# Patient Record
Sex: FEMALE | HISPANIC OR LATINO | Employment: FULL TIME | URBAN - METROPOLITAN AREA
[De-identification: names, ages, dates, MRNs, and addresses within clinical notes are randomized per-mention and may not be internally consistent; named-entity substitution may affect disease eponyms.]

---

## 2017-03-21 ENCOUNTER — OCCUPATIONAL MEDICINE (OUTPATIENT)
Dept: URGENT CARE | Facility: CLINIC | Age: 62
End: 2017-03-21
Payer: COMMERCIAL

## 2017-03-21 ENCOUNTER — APPOINTMENT (OUTPATIENT)
Dept: RADIOLOGY | Facility: IMAGING CENTER | Age: 62
End: 2017-03-21
Attending: FAMILY MEDICINE
Payer: COMMERCIAL

## 2017-03-21 VITALS
WEIGHT: 134 LBS | HEART RATE: 60 BPM | OXYGEN SATURATION: 98 % | RESPIRATION RATE: 14 BRPM | BODY MASS INDEX: 26.31 KG/M2 | TEMPERATURE: 97.3 F | SYSTOLIC BLOOD PRESSURE: 130 MMHG | DIASTOLIC BLOOD PRESSURE: 74 MMHG | HEIGHT: 60 IN

## 2017-03-21 DIAGNOSIS — S69.91XA HAND INJURY, RIGHT, INITIAL ENCOUNTER: ICD-10-CM

## 2017-03-21 DIAGNOSIS — Z02.1 PRE-EMPLOYMENT DRUG SCREENING: ICD-10-CM

## 2017-03-21 DIAGNOSIS — T14.8XXA HEMATOMA: ICD-10-CM

## 2017-03-21 LAB
AMP AMPHETAMINE: NORMAL
BREATH ALCOHOL COMMENT: NORMAL
COC COCAINE: NORMAL
INT CON NEG: NORMAL
INT CON POS: NORMAL
MET METHAMPHETAMINES: NORMAL
OPI OPIATES: NORMAL
PCP PHENCYCLIDINE: NORMAL
POC BREATHALIZER: 0 PERCENT (ref 0–0.01)
POC DRUG COMMENT 753798-OCCUPATIONAL HEALTH: NEGATIVE
THC: NORMAL

## 2017-03-21 PROCEDURE — 82075 ASSAY OF BREATH ETHANOL: CPT | Performed by: FAMILY MEDICINE

## 2017-03-21 PROCEDURE — 99202 OFFICE O/P NEW SF 15 MIN: CPT | Performed by: FAMILY MEDICINE

## 2017-03-21 PROCEDURE — 80300 POCT 6 PANEL URINE DRUG SCREEN - INSTANT: CPT | Performed by: FAMILY MEDICINE

## 2017-03-21 PROCEDURE — 73130 X-RAY EXAM OF HAND: CPT | Mod: TC,RT | Performed by: FAMILY MEDICINE

## 2017-03-21 NOTE — MR AVS SNAPSHOT
Katelynn Shaffer   3/21/2017 5:15 PM   Occupational Medicine   MRN: [id number]    Department:  [facility]   Dept Phone:  [phone]    Description:  Female : 1955   Provider:  Paul Fairchild M.D.           Reason for Visit     Hand Injury r hand injury today .      Allergies as of 3/21/2017     No Known Allergies      You were diagnosed with     Hematoma   [157978]       Pre-employment drug screening   [088630]         Vital Signs     Blood Pressure Pulse Temperature Respirations Height Weight    130/74 mmHg 60 36.3 °C (97.3 °F) 14 1.524 m (5') 60.782 kg (134 lb)    Body Mass Index Oxygen Saturation Smoking Status             26.17 kg/m2 98% Never Smoker          Basic Information     Date Of Birth Sex Race Ethnicity Preferred Language    1955 Female  or   Origin (British,Thai,Argentine,Raheem, etc) English      Problem List              ICD-10-CM Priority Class Noted - Resolved    Kidney stone N20.0 Low  2013 - Present    SBO (small bowel obstruction) (CMS-HCC) K56.69 Low  2013 - Present    NSTEMI (non-ST elevated myocardial infarction) (CMS-HCC) I21.4 Medium  6/3/2016 - Present    Obstruction of left ureteropelvic junction (UPJ) due to stone N20.1 Low  2016 - Present    Hydronephrosis, left N13.30 Low  2016 - Present    Dizziness R42 Medium  2016 - Present    Stented coronary artery Z95.5 Medium  6/10/2016 - Present    Coronary artery disease involving native coronary artery of native heart with angina pectoris (CMS-HCC) I25.119 Medium  2016 - Present    Pure hypercholesterolemia E78.00 Medium  2016 - Present      Health Maintenance        Date Due Completion Dates    IMM DTaP/Tdap/Td Vaccine (1 - Tdap) 1976 ---    PAP SMEAR 1978 ---    COLONOSCOPY 2007 ---    MAMMOGRAM 3/14/2013 3/14/2012    IMM INFLUENZA (1) 2016 ---            Results     POCT Breath Alcohol Test      Component Value Standard Range &  Units    POC Breathalizer 0.000 0.00 - 0.01 Percent    Breath Alcohol Comment                  POCT 6 Panel Urine Drug Screen      Component    AMPHETAMINE    POC THC    COCAINE    OPIATES    PHENCYCLIDINE    METHAMPHETAMINES    POC Urine Drug Screen Comment    Negative    Internal Control Positive    Valid    Internal Control Negative    Valid                        Current Immunizations     No immunizations on file.      Below and/or attached are the medications your provider expects you to take. Review all of your home medications and newly ordered medications with your provider and/or pharmacist. Follow medication instructions as directed by your provider and/or pharmacist. Please keep your medication list with you and share with your provider. Update the information when medications are discontinued, doses are changed, or new medications (including over-the-counter products) are added; and carry medication information at all times in the event of emergency situations     Allergies:  No Known Allergies          Medications  Valid as of: March 21, 2017 -  6:51 PM    Generic Name Brand Name Tablet Size Instructions for use    Aspirin (Tablet Delayed Response) ECOTRIN 81 MG Take 1 Tab by mouth every day.        Atorvastatin Calcium (Tab) LIPITOR 80 MG Take 1 Tab by mouth every day.        Clopidogrel Bisulfate (Tab) PLAVIX 75 MG Take 1 Tab by mouth every day.        Metoprolol Succinate (TABLET SR 24 HR) TOPROL XL 25 MG Take 1 Tab by mouth every day.        Multiple Vitamin (Tab) THERAGRAN  Take 1 Tab by mouth every day.        Nitroglycerin (SL Tab) NITROSTAT 0.4 MG Place 1 Tab under tongue as needed for Chest Pain.        Omega-3 Fatty Acids   Take 1 Cap by mouth every day.        Vitamin E (Cap) VITAMIN E 400 UNIT Take 400 Units by mouth every day.        .                 Medicines prescribed today were sent to:     [facility] [address] [place] [address], [place] - [id number] [facility]  [address]    Phone: [phone] Fax: [phone]    Open 24 Hours?: No      Medication refill instructions:       If your prescription bottle indicates you have medication refills left, it is not necessary to call your provider’s office. Please contact your pharmacy and they will refill your medication.    If your prescription bottle indicates you do not have any refills left, you may request refills at any time through one of the following ways: The online [employer] system (except Urgent Care), by calling your provider’s office, or by asking your pharmacy to contact your provider’s office with a refill request. Medication refills are processed only during regular business hours and may not be available until the next business day. Your provider may request additional information or to have a follow-up visit with you prior to refilling your medication.   *Please Note: Medication refills are assigned a new Rx number when refilled electronically. Your pharmacy may indicate that no refills were authorized even though a new prescription for the same medication is available at the pharmacy. Please request the medicine by name with the pharmacy before contacting your provider for a refill.        Your To Do List     Future Labs/Procedures Complete By Expires    DX-HAND 3+ RIGHT  As directed 3/21/2018      Instructions    Hematoma  (Hematoma)  Un hematoma es dylan acumulación de edd debajo de la piel, en un órgano, en un sitio del cuerpo, en un espacio articular, o en otro tejido. La edd puede coagularse para formar dylan masa que se puede beto y sentir. El bulto suele ser firme, y a veces doloroso y sensible. La mayoría de los hematomas mejoran en unos pocos días o semanas. Sin embargo, algunos hematomas pueden ser graves y requieren atención médica. Los hematomas pueden variar en tamaño desde muy pequeños hasta muy grandes.  CAUSAS   La causa de un hematoma puede ser un traumatismo cerrado o penetrante. Otra causa puede ser  la pérdida de edd espontánea de un vaso sanguíneo bajo la piel. La pérdida espontánea de edd en un vaso sanguíneo es probable que ocurra en personas de edad avanzada, especialmente en los que ashleigh anticoagulantes. A veces, un hematoma puede aparecer después de ciertos procedimientos médicos.  SIGNOS Y SÍNTOMAS   · Un bulto firme en el cuerpo.  · Dolor y sensibilidad en el área.  · Moretón.  La piel puede aparecer de color jaskaran, cox púrpura o amarilla en el sitio del hematoma, si está cerca de la superficie de la piel.  En los hematomas que se encuentran en tejidos más profundos, los signos y los síntomas pueden ser sutiles. Por ejemplo, un hematoma intraabdominal puede causar dolor, debilidad, desmayos y dificultad para respirar. Un hematoma intracraneal puede causar dolor de chasity o síntomas bradly debilidad, dificultad para hablar o un cambio en el estado de conciencia.  DIAGNÓSTICO   El hematoma generalmente se diagnostica basándose en la historia clínica y con un examen físico. Será necesario realizar un diagnóstico por imágenes si el médico sospecha un hematoma en tejidos profundos o espacios corporales, bradly el abdomen, la chasity o el tórax. Estos estudios pueden incluir dylan ecografía o dylan tomografía computada.   TRATAMIENTO   Los hematomas generalmente desaparecen por sí mismos con el tiempo. Renetta vez la edd debe ser drenada. Los hematomas más grandes o los que puedan afectar órganos vitales requerirán un drenaje quirúrgico o controles.  INSTRUCCIONES PARA EL CUIDADO EN EL HOGAR   · Aplique hielo sobre la fiorella lesionada:   ¨ Ponga el hielo en dylan bolsa plástica.    ¨ Colóquese dylan toalla entre la piel y la bolsa de hielo.    ¨ Deje el hielo wilma 20 minutos 2 a 3 veces por día, wilma los 1 o 2 primeros días.    · Después de los primeros 2 días, aplique compresas calientes sobre el hematoma.    · Eleve el área lesionada para ayudar a reducir el dolor y la hinchazón. Envolver la fiorella con un  vendaje elástico también puede ser útil. La compresión ayuda a reducir la inflamación y promueve la reducción del hematoma. Asegúrese de que el vendaje no se ajusta demasiado.    · Si el hematoma se produjo en dylan extremidad inferior y es dolorosa, puede aliviarlo el uso de muletas wilma algunos días.    · [place] sólo medicamentos de venta den o recetados, según las indicaciones del médico.  SOLICITE ATENCIÓN MÉDICA DE INMEDIATO SI:   · Aumenta el dolor y no puede controlarlo con la medicación.    · Tiene fiebre.    · La hinchazón o la decoloración aumentan.    · La piel sobre el hematoma se rompe o comienza a sangrar.    · El hematoma se encuentra en el tórax o el abdomen y siente debilidad, le falta el aire o cambia moreno estado de conciencia.  · El hematoma se encuentra en el cuero cabelludo (causado por dylan caída o dylan lesión) y tiene un dolor de chasity que empeora o hay un cambio en el estado de alerta o de conciencia.  ASEGÚRESE DE QUE:   · Comprende estas instrucciones.  · Controlará moreno afección.  · Recibirá ayuda de inmediato si no mejora o si empeora.     Esta información no tiene bradly fin reemplazar el consejo del médico. Asegúrese de hacerle al médico cualquier pregunta que tenga.     Document Released: 03/26/2009 Document Revised: 08/20/2014  [employer] Interactive Patient Education ©2016 [employer]            Coub Access Code: Activation code not generated  Current Coub Status: Active

## 2017-03-21 NOTE — Clinical Note
EMPLOYEE’S CLAIM FOR COMPENSATION/ REPORT OF INITIAL TREATMENT  FORM C-4    EMPLOYEE’S CLAIM - PROVIDE ALL INFORMATION REQUESTED   First Name  Katelynn Last Name  Edmund Birthdate                    1955                Sex  female Claim Number   Home Address  [address]  Age  61 y.o. Height  1.524 m (5') Weight  60.782 kg (134 lb) [facility]     [facility] [place]  [address] Telephone  [phone] (home)    Mailing Address  [address]  [facility] [place] [facility] [place]  [address] Primary Language Spoken  English    Insurer  [employer]   Workers Choice   Employee's Occupation (Job Title) When Injury or Occupational Disease Occurred       Employer's Name  [employer]  Telephone  [phone]    Employer Address  [address]  [facility] [place] [facility]  Zip  [address]   Date of Injury  3/20/2017               Hour of Injury  10:40 PM Date Employer Notified  3/20/2017 Last Day of Work after Injury or Occupational Disease  3/20/2017 Supervisor to Whom Injury Reported  KAITLYNN BORGES   Address or Location of Accident (if applicable)  [[address] ]   What were you doing at the time of accident? 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 (Be specific an answer in detail. 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  NA Witnesses to the Accident  NA      Nature of Injury or Occupational Disease  Contusion  Part(s) of Body Injured or  Affected  Hand (R), N/A, N/A    I certify that the above is true and correct to the best of my knowledge and that I have provided this information in order to obtain the benefits of [place]  I hereby authorize any physician, chiropractor, surgeon, practitioner, or other person, any hospital, including [facility] or [facility], any medical service organization, any insurance company, or other institution or organization to release to each other, any medical or other information, including benefits paid or payable, pertinent to this injury or disease, except information relative to diagnosis, treatment and/or counseling for AIDS, psychological conditions, alcohol or controlled substances, for which I must give specific authorization.  A Photostat of this authorization shall be as valid as the original.     Date   Place   Employee’s Signature   THIS REPORT MUST BE COMPLETED AND MAILED WITHIN 3 WORKING DAYS OF TREATMENT   Place  [facility]  Name of [facility]   Date  3/21/2017 Diagnosis  S69.91XA) Hand injury, right, initial encounter   Is there evidence the injured employee was under the influence of alcohol and/or another controlled substance at the time of accident?   Hour  5:34 PM Description of Injury or Disease  Diagnoses of Hand injury, right, initial encounter No   Treatment  Rice therapy. Ibuprofen as needed.  Have you advised the patient to remain off work five days or more? No   X-Ray Findings  Negative   If Yes   From Date  To Date      From information given by the employee, together with medical evidence, can you directly connect this injury or occupational disease as job incurred?  Yes If No Full Duty  Yes Modified Duty      Is additional medical care by a physician indicated?  No If Modified Duty, Specify any Limitations / Restrictions      Do you know of  "any previous injury or disease contributing to this condition or occupational disease?                            No   Date  3/21/2017 Print Doctor’s Name Abby Fairchild M.D. I certify the employer’s copy of  this form was mailed on:   Address  [address]     [facility] Zip  [address]    Provider’s Tax ID Number  [id number]  Telephone  Dept: [phone]        monica-ABBY Barahona M.D.   e-Signature: Dr. Kalin Pennington, Medical Director Degree  MD        ORIGINAL-TREATING PHYSICIAN OR CHIROPRACTOR    PAGE 2-INSURER/TPA    PAGE 3-EMPLOYER    PAGE 4-EMPLOYEE             Form C-4 (rev10/07)              BRIEF DESCRIPTION OF RIGHTS AND BENEFITS  (Pursuant to [place] 616C.050)    Notice of Injury or Occupational Disease (Incident Report Form C-1): If an injury or occupational disease (OD) arises out of and in the  course of employment, you must provide written notice to your employer as soon as practicable, but no later than 7 days after the accident or  OD. Your employer shall maintain a sufficient supply of the required forms.    Claim for Compensation (Form C-4): If medical treatment is sought, the form C-4 is available at the place of initial treatment. A completed  \"Claim for Compensation\" (Form C-4) must be filed within 90 days after an accident or OD. The treating physician or chiropractor must,  within 3 working days after treatment, complete and mail to the employer, the employer's insurer and third-party , the Claim for  Compensation.    Medical Treatment: If you require medical treatment for your on-the-job injury or OD, you may be required to select a physician or  chiropractor from a list provided by your workers’ compensation insurer, if it has contracted with an Organization for Managed Care (MCO) or  Preferred Provider Organization (PPO) or providers of health care. If your employer has not entered into a contract with an MCO or PPO, you  may select a " physician or chiropractor from the Panel of Physicians and Chiropractors. Any medical costs related to your industrial injury or  OD will be paid by your insurer.    Temporary Total Disability (TTD): If your doctor has certified that you are unable to work for a period of at least 5 consecutive days, or 5  cumulative days in a 20-day period, or places restrictions on you that your employer does not accommodate, you may be entitled to TTD  compensation.    Temporary Partial Disability (TPD): If the wage you receive upon reemployment is less than the compensation for TTD to which you are  entitled, the insurer may be required to pay you TPD compensation to make up the difference. TPD can only be paid for a maximum of 24  months.    Permanent Partial Disability (PPD): When your medical condition is stable and there is an indication of a PPD as a result of your injury or  OD, within 30 days, your insurer must arrange for an evaluation by a rating physician or chiropractor to determine the degree of your PPD. The  amount of your PPD award depends on the date of injury, the results of the PPD evaluation and your age and wage.    Permanent Total Disability (PTD): If you are medically certified by a treating physician or chiropractor as permanently and totally disabled  and have been granted a PTD status by your insurer, you are entitled to receive monthly benefits not to exceed 66 2/3% of your average  monthly wage. The amount of your PTD payments is subject to reduction if you previously received a PPD award.    Vocational Rehabilitation Services: You may be eligible for vocational rehabilitation services if you are unable to return to the job due to a  permanent physical impairment or permanent restrictions as a result of your injury or occupational disease.    Transportation and Per Scooby Reimbursement: You may be eligible for travel expenses and per scooby associated with medical treatment.    Reopening: You may be able  to reopen your claim if your condition worsens after claim closure.    Appeal Process: If you disagree with a written determination issued by the insurer or the insurer does not respond to your request, you may  appeal to the Department of Administration, , by following the instructions contained in your determination letter. You must  appeal the determination within 70 days from the date of the determination letter at [address], [address], [place], [place]  [address], or [address], [place], [place] [address]. If you disagree with the  decision, you may appeal to the  Department of Administration, . You must file your appeal within 30 days from the date of the  decision  letter at [address], [address], [place], [place] [address], or [address], [place], [place] [address]. If you  disagree with a decision of an , you may file a petition for judicial review with the District Court. You must do so within 30  days of the Appeal Officer’s decision. You may be represented by an  at your own expense or you may contact the [facility] for possible  representation.    [place]  for Injured Workers (NAIW): If you disagree with a  decision, you may request that NAIW represent you  without charge at an  Hearing. For information regarding denial of benefits, you may contact the [facility] at: [address], [place] [address], [phone], or [address], [place] [address], [phone]    To File a Complaint with the Division: If you wish to file a complaint with the  of the Division of Industrial Relations (DIR),  please contact the Workers’ Compensation Section, [address], [address], [place], [place] [address], telephone [phone]) [phone], or  [address], [address],  [place], [place] [address], telephone [phone].    For assistance with Workers’ Compensation Issues: you may contact the Office of the Governor Consumer Health Assistance, [address] [place] [address], [address], [place], [place] [address], Toll Free 1-447.470.7843, Web site: [email or web address]., E-mail  [email or web address].                                                                                                                                                                                                                                   __________________________________________________________________                                                                   _________________                Employee Name / Signature                                                                                                                                                       Date                                                                                                                                                                                                     D-2 (rev. 10/07)

## 2017-03-21 NOTE — Clinical Note
[address] [place] [address]  Phone: [phone] - Fax: [phone]        [facility] Progress Report and Disability Certification  Date of Service: 3/21/2017   No Show:  No  Date / Time of Next Visit:  MMI   Claim Information   Patient Name: Katelynn Shaffer  Claim Number:     Employer: [employer]  Date of Injury: 3/20/2017     Insurer / TPA: Workers Choice  ID / SSN:     Occupation:    Diagnosis: Diagnoses of Hand injury, right, initial encounter were pertinent to this visit.    Medical Information   Related to Industrial Injury? Yes    Subjective Complaints:  Date of injury 3/20/2017. Crush injury sustained to right hand. Pain with movement. No weakness noted. No sensation loss noted. Swelling and discoloration noted by patient immediately after injury.   Objective Findings: Large hematoma noted to dorsum of right hand with tenderness to palpation palmar and dorsal aspects of right hand. Strength intact. Neurovascular intact right hand.   Pre-Existing Condition(s):     Assessment:   Initial Visit    Status: Discharged /  MMI  Permanent Disability:No    Plan:      Diagnostics: X-ray    Comments:       Disability Information   Status: Released to Full Duty    From:     Through:   Restrictions are:     Physical Restrictions   Sitting:    Standing:    Stooping:    Bending:      Squatting:    Walking:    Climbing:    Pushing:      Pulling:    Other:    Reaching Above Shoulder (L):   Reaching Above Shoulder (R):       Reaching Below Shoulder (L):    Reaching Below Shoulder (R):      Not to exceed Weight Limits   Carrying(hrs):   Weight Limit(lb):   Lifting(hrs):   Weight  Limit(lb):     Comments:      Repetitive Actions   Hands: i.e. Fine Manipulations from Grasping:     Feet: i.e. Operating Foot Controls:     Driving / Operate Machinery:     Physician Name: Paul Fairchild M.D. Physician Signature: PAUL Caceres  M.D. e-Signature: Dr. Kalin Pennington, Medical Director   Clinic Name / Location: [address]  [place] [address] Clinic Phone Number: Dept: [phone]   Appointment Time: 5:15 Pm Visit Start Time: 5:34 PM   Check-In Time:  5:24 Pm Visit Discharge Time:  6:29 PM    Original-Treating Physician or Chiropractor    Page 2-Insurer/TPA    Page 3-Employer    Page 4-Employee

## 2017-03-22 NOTE — PATIENT INSTRUCTIONS
Hematoma  (Hematoma)  Un hematoma es dylan acumulación de ded debajo de la piel, en un órgano, en un sitio del cuerpo, en un espacio articular, o en otro tejido. La edd puede coagularse para formar dylan masa que se puede beto y sentir. El bulto suele ser firme, y a veces doloroso y sensible. La mayoría de los hematomas mejoran en unos pocos días o semanas. Sin embargo, algunos hematomas pueden ser graves y requieren atención médica. Los hematomas pueden variar en tamaño desde muy pequeños hasta muy grandes.  CAUSAS   La causa de un hematoma puede ser un traumatismo cerrado o penetrante. Otra causa puede ser la pérdida de edd espontánea de un vaso sanguíneo bajo la piel. La pérdida espontánea de edd en un vaso sanguíneo es probable que ocurra en personas de edad avanzada, especialmente en los que ashleigh anticoagulantes. A veces, un hematoma puede aparecer después de ciertos procedimientos médicos.  SIGNOS Y SÍNTOMAS   · Un bulto firme en el cuerpo.  · Dolor y sensibilidad en el área.  · Moretón.  La piel puede aparecer de color jaskaran, cox púrpura o amarilla en el sitio del hematoma, si está cerca de la superficie de la piel.  En los hematomas que se encuentran en tejidos más profundos, los signos y los síntomas pueden ser sutiles. Por ejemplo, un hematoma intraabdominal puede causar dolor, debilidad, desmayos y dificultad para respirar. Un hematoma intracraneal puede causar dolor de chasity o síntomas bradly debilidad, dificultad para hablar o un cambio en el estado de conciencia.  DIAGNÓSTICO   El hematoma generalmente se diagnostica basándose en la historia clínica y con un examen físico. Será necesario realizar un diagnóstico por imágenes si el médico sospecha un hematoma en tejidos profundos o espacios corporales, bradly el abdomen, la chasity o el tórax. Estos estudios pueden incluir dylan ecografía o dylan tomografía computada.   TRATAMIENTO   Los hematomas generalmente desaparecen por sí mismos con el tiempo.  Renetta vez la edd debe ser drenada. Los hematomas más grandes o los que puedan afectar órganos vitales requerirán un drenaje quirúrgico o controles.  INSTRUCCIONES PARA EL CUIDADO EN EL HOGAR   · Aplique hielo sobre la fiorella lesionada:   ¨ Ponga el hielo en dylan bolsa plástica.    ¨ Colóquese dylan toalla entre la piel y la bolsa de hielo.    ¨ Deje el hielo wilma 20 minutos 2 a 3 veces por día, wilma los 1 o 2 primeros días.    · Después de los primeros 2 días, aplique compresas calientes sobre el hematoma.    · Eleve el área lesionada para ayudar a reducir el dolor y la hinchazón. Envolver la fiorella con un vendaje elástico también puede ser útil. La compresión ayuda a reducir la inflamación y promueve la reducción del hematoma. Asegúrese de que el vendaje no se ajusta demasiado.    · Si el hematoma se produjo en dylan extremidad inferior y es dolorosa, puede aliviarlo el uso de muletas wilma algunos días.    · [place] sólo medicamentos de venta den o recetados, según las indicaciones del médico.  SOLICITE ATENCIÓN MÉDICA DE INMEDIATO SI:   · Aumenta el dolor y no puede controlarlo con la medicación.    · Tiene fiebre.    · La hinchazón o la decoloración aumentan.    · La piel sobre el hematoma se rompe o comienza a sangrar.    · El hematoma se encuentra en el tórax o el abdomen y siente debilidad, le falta el aire o cambia moreno estado de conciencia.  · El hematoma se encuentra en el cuero cabelludo (causado por dylan caída o dylan lesión) y tiene un dolor de chasity que empeora o hay un cambio en el estado de alerta o de conciencia.  ASEGÚRESE DE QUE:   · Comprende estas instrucciones.  · Controlará moreno afección.  · Recibirá ayuda de inmediato si no mejora o si empeora.     Esta información no tiene bradly fin reemplazar el consejo del médico. Asegúrese de hacerle al médico cualquier pregunta que tenga.     Document Released: 03/26/2009 Document Revised: 08/20/2014  Elsevier Interactive Patient Education ©2016 Elsevier  Inc.

## 2017-04-03 NOTE — PROGRESS NOTES
Subjective:      Katelynn Shaffer is a 61 y.o. female who presents with Hand Injury      Date of injury 3/21/2017. Crush injury sustained to right hand. Pain with movement. No weakness noted. No sensation loss noted. Swelling and discoloration noted by patient immediately after injury.     Hand Injury  This is a new problem. The current episode started today. The problem occurs constantly.       ROS       Objective:     /74 mmHg  Pulse 60  Temp(Src) 36.3 °C (97.3 °F)  Resp 14  Ht 1.524 m (5')  Wt 60.782 kg (134 lb)  BMI 26.17 kg/m2  SpO2 98%     Physical Exam   Constitutional: She is oriented to person, place, and time. She appears well-developed and well-nourished. No distress.   Eyes: EOM are normal. Pupils are equal, round, and reactive to light.   Cardiovascular: Normal rate, regular rhythm, normal heart sounds and intact distal pulses.    Pulmonary/Chest: Effort normal and breath sounds normal. No respiratory distress.   Abdominal: Soft. Bowel sounds are normal. She exhibits no distension.   Musculoskeletal: Normal range of motion.   Neurological: She is alert and oriented to person, place, and time. She has normal reflexes.   Skin: Skin is warm and dry.   Psychiatric: She has a normal mood and affect. Her behavior is normal.       Large hematoma noted to dorsum of right hand with tenderness to palpation palmar and dorsal aspects of right hand. Strength intact. Neurovascular intact right hand.       Assessment/Plan:     1. Hematoma  Differential diagnosis, natural history, supportive care, and indications for immediate follow-up discussed. Use over-the-counter pain reliever, such as acetaminophen (Tylenol), ibuprofen (Advil, Motrin) or naproxen (Aleve) as needed; follow package directions for dosing.   - DX-HAND 3+ RIGHT; Future  - POCT Breath Alcohol Test  - POCT 6 Panel Urine Drug Screen    2. Pre-employment drug screening  Differential diagnosis, natural history, supportive care, and  indications for immediate follow-up discussed.   - POCT Breath Alcohol Test  - POCT 6 Panel Urine Drug Screen

## 2017-05-10 ENCOUNTER — OFFICE VISIT (OUTPATIENT)
Dept: CARDIOLOGY | Facility: MEDICAL CENTER | Age: 62
End: 2017-05-10
Payer: COMMERCIAL

## 2017-05-10 VITALS
BODY MASS INDEX: 24.94 KG/M2 | HEART RATE: 60 BPM | OXYGEN SATURATION: 97 % | DIASTOLIC BLOOD PRESSURE: 70 MMHG | SYSTOLIC BLOOD PRESSURE: 122 MMHG | WEIGHT: 127 LBS | HEIGHT: 60 IN

## 2017-05-10 DIAGNOSIS — R20.0 LEFT ARM NUMBNESS: ICD-10-CM

## 2017-05-10 DIAGNOSIS — E78.5 DYSLIPIDEMIA: ICD-10-CM

## 2017-05-10 DIAGNOSIS — I25.119 CORONARY ARTERY DISEASE INVOLVING NATIVE CORONARY ARTERY OF NATIVE HEART WITH ANGINA PECTORIS (HCC): ICD-10-CM

## 2017-05-10 PROCEDURE — 99214 OFFICE O/P EST MOD 30 MIN: CPT | Performed by: INTERNAL MEDICINE

## 2017-05-10 NOTE — PROGRESS NOTES
Subjective:   Katelynn Shaffer is a 61 y.o. female who presents today for follow-up because of a history of coronary artery disease and dyslipidemia. She's been having difficulty with some left arm numbness continuously over the last couple of weeks.    Over the last couple of days she's noted some continuous left arm numbness which is associated with some pain if she squeezes her hand. She has not really noted any weakness. This tingling sensation and discomfort started in her hand. When the hand is painful than the numbness seems to radiate up her arm.    She's been having some left parasternal discomfort over the left breast. This is in a 2-4 cm diameter area. She describes this as a pressure type sensation which she rates as about 8/10. It lasts about 20 minutes to all day. This pain is associated with her hand discomfort. She notes no aggravating or alleviating factors. It does seem to be worse when she is lying down.    This discomfort is completely different from the discomfort she had with her myocardial infarction prior to her stent placement.    She does have some dyspnea on exertion especially when she is climbing stairs. She's noted no edema, PND or orthopnea.    Past Medical History   Diagnosis Date   • Kidney stones    • Hyperlipidemia    • CAD (coronary artery disease) 06/2016     MIKI X 2 to mid and distal LAD and balloon to diagonal in 12/16     Past Surgical History   Procedure Laterality Date   • Lithotripsy     • Cardiac cath     • Angioplasty       History reviewed. No pertinent family history.  History   Smoking status   • Never Smoker    Smokeless tobacco   • Not on file     No Known Allergies  Outpatient Encounter Prescriptions as of 5/10/2017   Medication Sig Dispense Refill   • nitroglycerin (NITROSTAT) 0.4 MG SL Tab Place 1 Tab under tongue as needed for Chest Pain. 25 Tab 11   • clopidogrel (PLAVIX) 75 MG Tab Take 1 Tab by mouth every day. 90 Tab 3   • atorvastatin (LIPITOR) 80 MG  tablet Take 1 Tab by mouth every day. 90 Tab 3   • aspirin EC (ECOTRIN) 81 MG Tablet Delayed Response Take 1 Tab by mouth every day. 90 Tab 3   • metoprolol SR (TOPROL XL) 25 MG TABLET SR 24 HR Take 1 Tab by mouth every day. 30 Tab 6   • Omega-3 Fatty Acids (OMEGA 3 PO) Take 1 Cap by mouth every day.     • vitamin e (VITAMIN E) 400 UNIT Cap Take 400 Units by mouth every day.     • multivitamin (THERAGRAN) Tab Take 1 Tab by mouth every day.       No facility-administered encounter medications on file as of 5/10/2017.     ROS     Objective:   /70 mmHg  Pulse 60  Ht 1.524 m (5')  Wt 57.607 kg (127 lb)  BMI 24.80 kg/m2  SpO2 97%    Physical Exam   Neck: No JVD present.   Cardiovascular: Normal rate and regular rhythm.  Exam reveals no gallop.    No murmur heard.  Pulmonary/Chest: Effort normal. She has no rales.   Abdominal: Soft. There is no tenderness.   Musculoskeletal: She exhibits no edema.     Lab Results   Component Value Date/Time    CHOLESTEROL, 11/15/2016 10:46 AM    LDL 73 11/15/2016 10:46 AM    HDL 47 11/15/2016 10:46 AM    TRIGLYCERIDES 159* 11/15/2016 10:46 AM       Lab Results   Component Value Date/Time    SODIUM 138 12/12/2016 09:10 AM    POTASSIUM 4.0 12/12/2016 09:10 AM    CHLORIDE 107 12/12/2016 09:10 AM    CO2 26 12/12/2016 09:10 AM    GLUCOSE 109* 12/12/2016 09:10 AM    BUN 12 12/12/2016 09:10 AM    CREATININE 0.58 12/12/2016 09:10 AM     Lab Results   Component Value Date/Time    ALKALINE PHOSPHATASE 133* 11/15/2016 10:46 AM    AST(SGOT) 17 11/15/2016 10:46 AM    ALT(SGPT) 15 11/15/2016 10:46 AM    TOTAL BILIRUBIN 0.8 11/15/2016 10:46 AM      Lab Results   Component Value Date/Time    B NATRIURETIC PEPTIDE 35 06/03/2016 03:23 PM          Assessment:     1. Coronary artery disease: MIKI to LAD ×2 and 6/2016     2. Dyslipidemia     3. Left arm numbness         Medical Decision Making:  Today's Assessment / Status / Plan:     Ms. Shaffer is having some difficulty with atypical chest  discomfort. This is not similar to the pain with her myocardial infarction but she is certainly at risk. She'll be reevaluated with a myocardial perfusion scan. I suspect her hand pain may be secondary to carpal tunnel syndrome. She'll be asked to obtain a hand brace and take over-the-counter naproxen 225 mg 2 Twice a day for a couple weeks. She will follow-up in a couple of weeks. She will have a lipid panel and CMP prior to her follow-up appointment.

## 2017-05-10 NOTE — MR AVS SNAPSHOT
Katelynn Shaffer   5/10/2017 8:30 AM   Office Visit   MRN: [id number]    Department:  [facility]   Dept Phone:  [phone]    Description:  Female : 1955   Provider:  Fransico Fatima M.D.           Reason for Visit     Follow-Up           Allergies as of 5/10/2017     No Known Allergies      You were diagnosed with     Coronary artery disease involving native coronary artery of native heart with angina pectoris (CMS-HCC)   [2715063]       Dyslipidemia   [980891]       Left arm numbness   [945035]         Vital Signs     Blood Pressure Pulse Height Weight Body Mass Index Oxygen Saturation    122/70 mmHg 60 1.524 m (5') 57.607 kg (127 lb) 24.80 kg/m2 97%    Smoking Status                   Never Smoker            Basic Information     Date Of Birth Sex Race Ethnicity Preferred Language    1955 Female  or   Origin (Tongan,Botswanan,[place] Korean,St Helenian, etc) English      Your appointments     May 10, 2017  8:30 AM   PREVIOUS PATIENT with Fransico Fatima M.D.   [facility] (--)    [address], [address]  [place] [address]   [phone]              Problem List              ICD-10-CM Priority Class Noted - Resolved    Kidney stone N20.0 Low  2013 - Present    SBO (small bowel obstruction) (CMS-HCC) K56.69 Low  2013 - Present    NSTEMI (non-ST elevated myocardial infarction) (CMS-HCC) I21.4 Medium  6/3/2016 - Present    Obstruction of left ureteropelvic junction (UPJ) due to stone N20.1 Low  2016 - Present    Hydronephrosis, left N13.30 Low  2016 - Present    Dizziness R42 Medium  2016 - Present    Stented coronary artery Z95.5 Medium  6/10/2016 - Present    Coronary artery disease: MIKI to LAD ×2 and 2016 I25.119 Medium  2016 - Present    Pure hypercholesterolemia E78.00 Medium  2016 - Present    Dyslipidemia E78.5   5/10/2017 - Present    Left arm numbness R20.0   5/10/2017 - Present         Health Maintenance        Date Due Completion Dates    IMM DTaP/Tdap/Td Vaccine (1 - Tdap) 8/11/1976 ---    PAP SMEAR 8/11/1978 ---    COLONOSCOPY 8/11/2007 ---    MAMMOGRAM 3/14/2013 3/14/2012            Current Immunizations     No immunizations on file.      Below and/or attached are the medications your provider expects you to take. Review all of your home medications and newly ordered medications with your provider and/or pharmacist. Follow medication instructions as directed by your provider and/or pharmacist. Please keep your medication list with you and share with your provider. Update the information when medications are discontinued, doses are changed, or new medications (including over-the-counter products) are added; and carry medication information at all times in the event of emergency situations     Allergies:  No Known Allergies          Medications  Valid as of: May 10, 2017 -  8:09 AM    Generic Name Brand Name Tablet Size Instructions for use    Aspirin (Tablet Delayed Response) ECOTRIN 81 MG Take 1 Tab by mouth every day.        Atorvastatin Calcium (Tab) LIPITOR 80 MG Take 1 Tab by mouth every day.        Clopidogrel Bisulfate (Tab) PLAVIX 75 MG Take 1 Tab by mouth every day.        Metoprolol Succinate (TABLET SR 24 HR) TOPROL XL 25 MG Take 1 Tab by mouth every day.        Multiple Vitamin (Tab) THERAGRAN  Take 1 Tab by mouth every day.        Nitroglycerin (SL Tab) NITROSTAT 0.4 MG Place 1 Tab under tongue as needed for Chest Pain.        Omega-3 Fatty Acids   Take 1 Cap by mouth every day.        Vitamin E (Cap) VITAMIN E 400 UNIT Take 400 Units by mouth every day.        .                 Medicines prescribed today were sent to:     [facility] [address] [place], [place] [address] [place] [address] [facility] [address]    Phone: [phone] Fax: [phone]    Open 24 Hours?: No      Medication refill instructions:       If your prescription bottle indicates you have  medication refills left, it is not necessary to call your provider’s office. Please contact your pharmacy and they will refill your medication.    If your prescription bottle indicates you do not have any refills left, you may request refills at any time through one of the following ways: The online [employer] system (except Urgent Care), by calling your provider’s office, or by asking your pharmacy to contact your provider’s office with a refill request. Medication refills are processed only during regular business hours and may not be available until the next business day. Your provider may request additional information or to have a follow-up visit with you prior to refilling your medication.   *Please Note: Medication refills are assigned a new Rx number when refilled electronically. Your pharmacy may indicate that no refills were authorized even though a new prescription for the same medication is available at the pharmacy. Please request the medicine by name with the pharmacy before contacting your provider for a refill.        Your To Do List     Future Labs/Procedures Complete By Expires    COMP METABOLIC PANEL  As directed 5/10/2018    LIPID PROFILE  As directed 5/10/2018    NM-CARDIAC STRESS TEST  As directed 5/10/2018      Instructions    Naproxen 220 mg 2 tabs twice daily for 2 weeks          [employer] Access Code: Activation code not generated  Current [employer] Status: Active

## 2017-05-10 NOTE — Clinical Note
[facility]   [address] [place] [address] [address]  [place] [address]  Phone: [phone]  Fax: [phone]              Katelynn Shaffer  1955    Encounter Date: 5/10/2017    Fransico Fatima M.D.          PROGRESS NOTE:  Subjective:   Katelynn Shafefr is a 61 y.o. female who presents today for follow-up because of a history of coronary artery disease and dyslipidemia. She's been having difficulty with some left arm numbness continuously over the last couple of weeks.    Over the last couple of days she's noted some continuous left arm numbness which is associated with some pain if she squeezes her hand. She has not really noted any weakness. This tingling sensation and discomfort started in her hand. When the hand is painful than the numbness seems to radiate up her arm.    She's been having some left parasternal discomfort over the left breast. This is in a 2-4 cm diameter area. She describes this as a pressure type sensation which she rates as about 8/10. It lasts about 20 minutes to all day. This pain is associated with her hand discomfort. She notes no aggravating or alleviating factors. It does seem to be worse when she is lying down.    This discomfort is completely different from the discomfort she had with her myocardial infarction prior to her stent placement.    She does have some dyspnea on exertion especially when she is climbing stairs. She's noted no edema, PND or orthopnea.    Past Medical History   Diagnosis Date   • Kidney stones    • Hyperlipidemia    • CAD (coronary artery disease) 06/2016     MIKI X 2 to mid and distal LAD and balloon to diagonal in 12/16     Past Surgical History   Procedure Laterality Date   • Lithotripsy     • Cardiac cath     • Angioplasty       History reviewed. No pertinent family history.  History   Smoking status   • Never Smoker    Smokeless tobacco   • Not on file     No Known Allergies  Outpatient Encounter  Prescriptions as of 5/10/2017   Medication Sig Dispense Refill   • nitroglycerin (NITROSTAT) 0.4 MG SL Tab Place 1 Tab under tongue as needed for Chest Pain. 25 Tab 11   • clopidogrel (PLAVIX) 75 MG Tab Take 1 Tab by mouth every day. 90 Tab 3   • atorvastatin (LIPITOR) 80 MG tablet Take 1 Tab by mouth every day. 90 Tab 3   • aspirin EC (ECOTRIN) 81 MG Tablet Delayed Response Take 1 Tab by mouth every day. 90 Tab 3   • metoprolol SR (TOPROL XL) 25 MG TABLET SR 24 HR Take 1 Tab by mouth every day. 30 Tab 6   • Omega-3 Fatty Acids (OMEGA 3 PO) Take 1 Cap by mouth every day.     • vitamin e (VITAMIN E) 400 UNIT Cap Take 400 Units by mouth every day.     • multivitamin (THERAGRAN) Tab Take 1 Tab by mouth every day.       No facility-administered encounter medications on file as of 5/10/2017.     ROS     Objective:   /70 mmHg  Pulse 60  Ht 1.524 m (5')  Wt 57.607 kg (127 lb)  BMI 24.80 kg/m2  SpO2 97%    Physical Exam   Neck: No JVD present.   Cardiovascular: Normal rate and regular rhythm.  Exam reveals no gallop.    No murmur heard.  Pulmonary/Chest: Effort normal. She has no rales.   Abdominal: Soft. There is no tenderness.   Musculoskeletal: She exhibits no edema.     Lab Results   Component Value Date/Time    CHOLESTEROL, 11/15/2016 10:46 AM    LDL 73 11/15/2016 10:46 AM    HDL 47 11/15/2016 10:46 AM    TRIGLYCERIDES 159* 11/15/2016 10:46 AM       Lab Results   Component Value Date/Time    SODIUM 138 12/12/2016 09:10 AM    POTASSIUM 4.0 12/12/2016 09:10 AM    CHLORIDE 107 12/12/2016 09:10 AM    CO2 26 12/12/2016 09:10 AM    GLUCOSE 109* 12/12/2016 09:10 AM    BUN 12 12/12/2016 09:10 AM    CREATININE 0.58 12/12/2016 09:10 AM     Lab Results   Component Value Date/Time    ALKALINE PHOSPHATASE 133* 11/15/2016 10:46 AM    AST(SGOT) 17 11/15/2016 10:46 AM    ALT(SGPT) 15 11/15/2016 10:46 AM    TOTAL BILIRUBIN 0.8 11/15/2016 10:46 AM      Lab Results   Component Value Date/Time    B NATRIURETIC PEPTIDE 35  06/03/2016 03:23 PM          Assessment:     1. Coronary artery disease: MIKI to LAD ×2 and 6/2016     2. Dyslipidemia     3. Left arm numbness         Medical Decision Making:  Today's Assessment / Status / Plan:     Ms. Shaffer is having some difficulty with atypical chest discomfort. This is not similar to the pain with her myocardial infarction but she is certainly at risk. She'll be reevaluated with a myocardial perfusion scan. I suspect her hand pain may be secondary to carpal tunnel syndrome. She'll be asked to obtain a hand brace and take over-the-counter naproxen 225 mg 2 Twice a day for a couple weeks. She will follow-up in a couple of weeks. She will have a lipid panel and CMP prior to her follow-up appointment.      JAGDISH Frey.  [address] [facility] [address]  VIA Facsimile: [phone]

## 2017-05-16 ENCOUNTER — HOSPITAL ENCOUNTER (OUTPATIENT)
Dept: RADIOLOGY | Facility: MEDICAL CENTER | Age: 62
End: 2017-05-16
Attending: INTERNAL MEDICINE
Payer: COMMERCIAL

## 2017-05-16 DIAGNOSIS — E78.5 DYSLIPIDEMIA: ICD-10-CM

## 2017-05-16 DIAGNOSIS — I25.119 CORONARY ARTERY DISEASE INVOLVING NATIVE CORONARY ARTERY OF NATIVE HEART WITH ANGINA PECTORIS (HCC): ICD-10-CM

## 2017-05-16 PROCEDURE — 700111 HCHG RX REV CODE 636 W/ 250 OVERRIDE (IP)

## 2017-05-16 PROCEDURE — A9502 TC99M TETROFOSMIN: HCPCS

## 2017-05-16 RX ORDER — REGADENOSON 0.08 MG/ML
INJECTION, SOLUTION INTRAVENOUS
Status: COMPLETED
Start: 2017-05-16 | End: 2017-05-16

## 2017-05-16 RX ADMIN — REGADENOSON 0.4 MG: 0.08 INJECTION, SOLUTION INTRAVENOUS at 10:31

## 2017-05-26 ENCOUNTER — HOSPITAL ENCOUNTER (OUTPATIENT)
Dept: LAB | Facility: MEDICAL CENTER | Age: 62
End: 2017-05-26
Attending: INTERNAL MEDICINE
Payer: COMMERCIAL

## 2017-05-26 DIAGNOSIS — E78.5 DYSLIPIDEMIA: ICD-10-CM

## 2017-05-26 DIAGNOSIS — I25.119 CORONARY ARTERY DISEASE INVOLVING NATIVE CORONARY ARTERY OF NATIVE HEART WITH ANGINA PECTORIS (HCC): ICD-10-CM

## 2017-05-26 LAB
ALBUMIN SERPL BCP-MCNC: 3.9 G/DL (ref 3.2–4.9)
ALBUMIN/GLOB SERPL: 1.3 G/DL
ALP SERPL-CCNC: 124 U/L (ref 30–99)
ALT SERPL-CCNC: 11 U/L (ref 2–50)
ANION GAP SERPL CALC-SCNC: 6 MMOL/L (ref 0–11.9)
AST SERPL-CCNC: 15 U/L (ref 12–45)
BILIRUB SERPL-MCNC: 0.5 MG/DL (ref 0.1–1.5)
BUN SERPL-MCNC: 15 MG/DL (ref 8–22)
CALCIUM SERPL-MCNC: 9.3 MG/DL (ref 8.5–10.5)
CHLORIDE SERPL-SCNC: 106 MMOL/L (ref 96–112)
CHOLEST SERPL-MCNC: 162 MG/DL (ref 100–199)
CO2 SERPL-SCNC: 27 MMOL/L (ref 20–33)
CREAT SERPL-MCNC: 0.54 MG/DL (ref 0.5–1.4)
GFR SERPL CREATININE-BSD FRML MDRD: >60 ML/MIN/1.73 M 2
GLOBULIN SER CALC-MCNC: 3.1 G/DL (ref 1.9–3.5)
GLUCOSE SERPL-MCNC: 91 MG/DL (ref 65–99)
HDLC SERPL-MCNC: 43 MG/DL
LDLC SERPL CALC-MCNC: 76 MG/DL
POTASSIUM SERPL-SCNC: 4.1 MMOL/L (ref 3.6–5.5)
PROT SERPL-MCNC: 7 G/DL (ref 6–8.2)
SODIUM SERPL-SCNC: 139 MMOL/L (ref 135–145)
TRIGL SERPL-MCNC: 215 MG/DL (ref 0–149)

## 2017-05-26 PROCEDURE — 80053 COMPREHEN METABOLIC PANEL: CPT

## 2017-05-26 PROCEDURE — 80061 LIPID PANEL: CPT

## 2017-05-26 PROCEDURE — 36415 COLL VENOUS BLD VENIPUNCTURE: CPT

## 2017-05-31 ENCOUNTER — OFFICE VISIT (OUTPATIENT)
Dept: CARDIOLOGY | Facility: MEDICAL CENTER | Age: 62
End: 2017-05-31
Payer: COMMERCIAL

## 2017-05-31 VITALS
HEIGHT: 60 IN | SYSTOLIC BLOOD PRESSURE: 130 MMHG | WEIGHT: 129 LBS | DIASTOLIC BLOOD PRESSURE: 78 MMHG | HEART RATE: 58 BPM | BODY MASS INDEX: 25.32 KG/M2 | OXYGEN SATURATION: 95 %

## 2017-05-31 DIAGNOSIS — I25.119 CORONARY ARTERY DISEASE INVOLVING NATIVE CORONARY ARTERY OF NATIVE HEART WITH ANGINA PECTORIS (HCC): ICD-10-CM

## 2017-05-31 DIAGNOSIS — E78.00 PURE HYPERCHOLESTEROLEMIA: ICD-10-CM

## 2017-05-31 DIAGNOSIS — R07.89 OTHER CHEST PAIN: ICD-10-CM

## 2017-05-31 DIAGNOSIS — R20.0 NUMBNESS OF LEFT HAND: ICD-10-CM

## 2017-05-31 PROCEDURE — 99214 OFFICE O/P EST MOD 30 MIN: CPT | Performed by: NURSE PRACTITIONER

## 2017-05-31 RX ORDER — METOPROLOL SUCCINATE 25 MG/1
25 TABLET, EXTENDED RELEASE ORAL DAILY
Qty: 90 TAB | Refills: 3 | Status: SHIPPED | OUTPATIENT
Start: 2017-05-31 | End: 2017-09-11 | Stop reason: SDUPTHER

## 2017-05-31 ASSESSMENT — ENCOUNTER SYMPTOMS
WEAKNESS: 0
PALPITATIONS: 0
DIZZINESS: 0
CLAUDICATION: 0
COUGH: 0
PND: 0
MYALGIAS: 0
ORTHOPNEA: 0
SENSORY CHANGE: 1
ABDOMINAL PAIN: 0
SHORTNESS OF BREATH: 0

## 2017-05-31 NOTE — MR AVS SNAPSHOT
Katelynn Hart   2017 7:40 AM   Office Visit   MRN: [id number]    Department:  [facility]   Dept Phone:  [phone]    Description:  Female : 1955   Provider:  SALUD Bobby           Reason for Visit     Follow-Up           Allergies as of 2017     No Known Allergies      You were diagnosed with     Other chest pain   [786.59.ICD-9-CM]       Numbness of left hand   [5231385]       Coronary artery disease involving native coronary artery of native heart with angina pectoris (CMS-HCC)   [6453444]       Pure hypercholesterolemia   [272.0.ICD-9-CM]         Vital Signs     Blood Pressure Pulse Height Weight Body Mass Index Oxygen Saturation    130/78 mmHg 58 1.524 m (5') 58.514 kg (129 lb) 25.19 kg/m2 95%    Smoking Status                   Never Smoker            Basic Information     Date Of Birth Sex Race Ethnicity Preferred Language    1955 Female  or   Origin (Pashto,Solomon Islander,Eritrean,Samoan, etc) English      Your appointments     2017  2:00 PM   New Patient with Taylor Ashraf M.D.   [facility])    [address]  [place] [address] [phone]            2017 10:20 AM   FOLLOW UP with SALUD Bobby   [facility] (--)    [address], [address]  [place] [address] [phone]              Problem List              ICD-10-CM Priority Class Noted - Resolved    Kidney stone N20.0 Low  2013 - Present    SBO (small bowel obstruction) (CMS-HCC) K56.69 Low  2013 - Present    NSTEMI (non-ST elevated myocardial infarction) (CMS-HCC) I21.4 Medium  6/3/2016 - Present    Obstruction of left ureteropelvic junction (UPJ) due to stone N20.1 Low  2016 - Present    Hydronephrosis, left N13.30 Low  2016 - Present    Dizziness R42 Medium  2016 - Present    Stented coronary artery Z95.5  Medium  6/10/2016 - Present    Coronary artery disease: MIKI to LAD ×2 and 6/2016 I25.119 Medium  8/2/2016 - Present    Dyslipidemia E78.5   5/10/2017 - Present    Numbness of left hand R20.8   5/31/2017 - Present      Health Maintenance        Date Due Completion Dates    IMM DTaP/Tdap/Td Vaccine (1 - Tdap) 8/11/1976 ---    PAP SMEAR 8/11/1978 ---    COLONOSCOPY 8/11/2007 ---    MAMMOGRAM 3/14/2013 3/14/2012            Current Immunizations     No immunizations on file.      Below and/or attached are the medications your provider expects you to take. Review all of your home medications and newly ordered medications with your provider and/or pharmacist. Follow medication instructions as directed by your provider and/or pharmacist. Please keep your medication list with you and share with your provider. Update the information when medications are discontinued, doses are changed, or new medications (including over-the-counter products) are added; and carry medication information at all times in the event of emergency situations     Allergies:  No Known Allergies          Medications  Valid as of: May 31, 2017 -  8:42 AM    Generic Name Brand Name Tablet Size Instructions for use    Aspirin (Tablet Delayed Response) ECOTRIN 81 MG Take 1 Tab by mouth every day.        Atorvastatin Calcium (Tab) LIPITOR 80 MG Take 1 Tab by mouth every day.        Metoprolol Succinate (TABLET SR 24 HR) TOPROL XL 25 MG Take 1 Tab by mouth every day.        Multiple Vitamin (Tab) THERAGRAN  Take 1 Tab by mouth every day.        Nitroglycerin (SL Tab) NITROSTAT 0.4 MG Place 1 Tab under tongue as needed for Chest Pain.        Omega-3 Fatty Acids   Take 1 Cap by mouth every day.        .                 Medicines prescribed today were sent to:     [facility] [address] [place] [address] [place] - [id number] [facility] [address]    Phone: [phone] Fax: [phone]    Open 24 Hours?: No      Medication refill  instructions:       If your prescription bottle indicates you have medication refills left, it is not necessary to call your provider’s office. Please contact your pharmacy and they will refill your medication.    If your prescription bottle indicates you do not have any refills left, you may request refills at any time through one of the following ways: The online Classana system (except Urgent Care), by calling your provider’s office, or by asking your pharmacy to contact your provider’s office with a refill request. Medication refills are processed only during regular business hours and may not be available until the next business day. Your provider may request additional information or to have a follow-up visit with you prior to refilling your medication.   *Please Note: Medication refills are assigned a new Rx number when refilled electronically. Your pharmacy may indicate that no refills were authorized even though a new prescription for the same medication is available at the pharmacy. Please request the medicine by name with the pharmacy before contacting your provider for a refill.           Classana Access Code: Activation code not generated  Current Classana Status: Active

## 2017-05-31 NOTE — PROGRESS NOTES
Subjective:   Katelynn Hart is a 61 y.o. Bengali-speaking female who presents today to follow-up on chest pain and left hand numbness. Translation was used Faisal [id number].     She was seen by Dr. Fatima on May 10, 2017 at which time she was complaining of some pain in her anterior chest and numbness in her left hand. He did not feel that this is cardiac but since she has a history of MI and 2 stents to her LAD he ordered a nuclear stress test. She is here for the results.    She works as a  at the [employer] but previous to her heart attack was a . She complains of numbness in her hand and minor weakness. She also has pain to the left of her sternum where she points with 2 fingers.    She walks to work and denies any chest discomfort with exertion. No shortness of breath.    Past Medical History   Diagnosis Date   • Kidney stones    • Hyperlipidemia    • CAD (coronary artery disease) 06/2016     MIKI X 2 to mid and distal LAD and balloon to diagonal in 12/16     Past Surgical History   Procedure Laterality Date   • Lithotripsy     • Angioplasty     • Cardiac cath  6/5/16      2 MIKI to LAD     History reviewed. No pertinent family history.  History   Smoking status   • Never Smoker    Smokeless tobacco   • Never Used     No Known Allergies  Outpatient Encounter Prescriptions as of 5/31/2017   Medication Sig Dispense Refill   • metoprolol SR (TOPROL XL) 25 MG TABLET SR 24 HR Take 1 Tab by mouth every day. 90 Tab 3   • atorvastatin (LIPITOR) 80 MG tablet Take 1 Tab by mouth every day. 90 Tab 3   • aspirin EC (ECOTRIN) 81 MG Tablet Delayed Response Take 1 Tab by mouth every day. 90 Tab 3   • Omega-3 Fatty Acids (OMEGA 3 PO) Take 1 Cap by mouth every day.     • multivitamin (THERAGRAN) Tab Take 1 Tab by mouth every day.     • nitroglycerin (NITROSTAT) 0.4 MG SL Tab Place 1 Tab under tongue as needed for Chest Pain. 25 Tab 11   • [DISCONTINUED] clopidogrel (PLAVIX) 75 MG Tab Take 1 Tab by mouth  every day. 90 Tab 3   • [DISCONTINUED] metoprolol SR (TOPROL XL) 25 MG TABLET SR 24 HR Take 1 Tab by mouth every day. 30 Tab 6   • [DISCONTINUED] vitamin e (VITAMIN E) 400 UNIT Cap Take 400 Units by mouth every day.       No facility-administered encounter medications on file as of 5/31/2017.     Review of Systems   Constitutional: Negative for malaise/fatigue.   Respiratory: Negative for cough and shortness of breath.    Cardiovascular: Positive for chest pain (point tenderness left of the sternum.). Negative for palpitations, orthopnea, claudication, leg swelling and PND.   Gastrointestinal: Negative for abdominal pain.   Musculoskeletal: Negative for myalgias.   Neurological: Positive for sensory change (numbness in her left hand.). Negative for dizziness and weakness.        Objective:   /78 mmHg  Pulse 58  Ht 1.524 m (5')  Wt 58.514 kg (129 lb)  BMI 25.19 kg/m2  SpO2 95%    Physical Exam   Constitutional: She is oriented to person, place, and time. She appears well-developed and well-nourished.   HENT:   Head: Normocephalic.   Eyes: Conjunctivae are normal.   Neck: No JVD present. No thyromegaly present.   Cardiovascular: Normal rate, regular rhythm, S1 normal, S2 normal and normal heart sounds.  Exam reveals no gallop, no S3, no S4 and no friction rub.    No murmur heard.  Pulmonary/Chest: Effort normal and breath sounds normal. No respiratory distress. She has no wheezes. She has no rales. She exhibits tenderness (tender over left chest. Palpation reproduces her pain.).   Abdominal: Soft. Bowel sounds are normal. She exhibits no distension. There is no tenderness.   Musculoskeletal: She exhibits no edema.   Minor weakness in the left hand  strength.   Neurological: She is alert and oriented to person, place, and time.   Skin: Skin is warm and dry.   Psychiatric: She has a normal mood and affect.     Results for ZEKE ROSS (MRN [id number]) as of 5/31/2017 07:58   Ref. Range 5/26/2017  07:51   Sodium Latest Ref Range: 135-145 mmol/L 139   Potassium Latest Ref Range: 3.6-5.5 mmol/L 4.1   Chloride Latest Ref Range:  mmol/L 106   Co2 Latest Ref Range: 20-33 mmol/L 27   Anion Gap Latest Ref Range: 0.0-11.9  6.0   Glucose Latest Ref Range: 65-99 mg/dL 91   Bun Latest Ref Range: 8-22 mg/dL 15   Creatinine Latest Ref Range: 0.50-1.40 mg/dL 0.54   GFR If  Latest Ref Range: >60 mL/min/1.73 m 2 >60   GFR If Non  Latest Ref Range: >60 mL/min/1.73 m 2 >60   Calcium Latest Ref Range: 8.5-10.5 mg/dL 9.3   AST(SGOT) Latest Ref Range: 12-45 U/L 15   ALT(SGPT) Latest Ref Range: 2-50 U/L 11   Alkaline Phosphatase Latest Ref Range: 30-99 U/L 124 (H)   Total Bilirubin Latest Ref Range: 0.1-1.5 mg/dL 0.5   Albumin Latest Ref Range: 3.2-4.9 g/dL 3.9   Total Protein Latest Ref Range: 6.0-8.2 g/dL 7.0   Globulin Latest Ref Range: 1.9-3.5 g/dL 3.1   A-G Ratio Latest Units: g/dL 1.3   Cholesterol,Tot Latest Ref Range: 100-199 mg/dL 162   Triglycerides Latest Ref Range: 0-149 mg/dL 215 (H)   HDL Latest Ref Range: >=40 mg/dL 43   LDL Latest Ref Range: <100 mg/dL 76     May 16, 2017: Myocardial perfusion imaging was negative for ischemia or prior MI. Normal left ventricular size. Ejection fraction 69%.    Assessment:     1. Other chest pain     2. Numbness of left hand     3. Coronary artery disease: MIKI to LAD ×2 and 6/2016     4. Pure hypercholesterolemia         Medical Decision Making:  Today's Assessment / Status / Plan:     Chest pain: Her chest pain is clearly musculoskeletal as it can be reduced by palpation over her last chest. Her nuclear stress test was negative for ischemia. She is reassured.    Numbness of left hand: Possibly carpal tunnel or other nerve compression. She does have minor weakness in that hand. I asked her to follow-up with her primary care. It's possible that this hand numbness is related to her work.    Hyperlipidemia: Her lipids are good with the exception  of her triglycerides which are elevated. I've asked her to reduce sweets and fats in her diet. She will increase her exercise to 30 minutes 5 days a week.    She is stable enough to follow-up in 6 months with myself. Sooner if problems.    Collaborating Provider: Dr. Fatima.

## 2017-05-31 NOTE — Clinical Note
[facility]   [address], [address]  [place] [address]  Phone: [phone]  Fax: [phone]              Katelynn Hart  1955    Encounter Date: 5/31/2017    SALUD Bobby          PROGRESS NOTE:  Subjective:   Katelynn Hart is a 61 y.o. Vietnamese-speaking female who presents today to follow-up on chest pain and left hand numbness. Translation was used Faisal [id number].     She was seen by Dr. Fatima on May 10, 2017 at which time she was complaining of some pain in her anterior chest and numbness in her left hand. He did not feel that this is cardiac but since she has a history of MI and 2 stents to her LAD he ordered a nuclear stress test. She is here for the results.    She works as a  at the [employer] but previous to her heart attack was a . She complains of numbness in her hand and minor weakness. She also has pain to the left of her sternum where she points with 2 fingers.    She walks to work and denies any chest discomfort with exertion. No shortness of breath.    Past Medical History   Diagnosis Date   • Kidney stones    • Hyperlipidemia    • CAD (coronary artery disease) 06/2016     MIKI X 2 to mid and distal LAD and balloon to diagonal in 12/16     Past Surgical History   Procedure Laterality Date   • Lithotripsy     • Angioplasty     • Cardiac cath  6/5/16      2 MIKI to LAD     History reviewed. No pertinent family history.  History   Smoking status   • Never Smoker    Smokeless tobacco   • Never Used     No Known Allergies  Outpatient Encounter Prescriptions as of 5/31/2017   Medication Sig Dispense Refill   • metoprolol SR (TOPROL XL) 25 MG TABLET SR 24 HR Take 1 Tab by mouth every day. 90 Tab 3   • atorvastatin (LIPITOR) 80 MG tablet Take 1 Tab by mouth every day. 90 Tab 3   • aspirin EC (ECOTRIN) 81 MG Tablet Delayed Response Take 1 Tab by mouth every day. 90 Tab 3   • Omega-3 Fatty Acids (OMEGA 3 PO)  Take 1 Cap by mouth every day.     • multivitamin (THERAGRAN) Tab Take 1 Tab by mouth every day.     • nitroglycerin (NITROSTAT) 0.4 MG SL Tab Place 1 Tab under tongue as needed for Chest Pain. 25 Tab 11   • [DISCONTINUED] clopidogrel (PLAVIX) 75 MG Tab Take 1 Tab by mouth every day. 90 Tab 3   • [DISCONTINUED] metoprolol SR (TOPROL XL) 25 MG TABLET SR 24 HR Take 1 Tab by mouth every day. 30 Tab 6   • [DISCONTINUED] vitamin e (VITAMIN E) 400 UNIT Cap Take 400 Units by mouth every day.       No facility-administered encounter medications on file as of 5/31/2017.     Review of Systems   Constitutional: Negative for malaise/fatigue.   Respiratory: Negative for cough and shortness of breath.    Cardiovascular: Positive for chest pain (point tenderness left of the sternum.). Negative for palpitations, orthopnea, claudication, leg swelling and PND.   Gastrointestinal: Negative for abdominal pain.   Musculoskeletal: Negative for myalgias.   Neurological: Positive for sensory change (numbness in her left hand.). Negative for dizziness and weakness.        Objective:   /78 mmHg  Pulse 58  Ht 1.524 m (5')  Wt 58.514 kg (129 lb)  BMI 25.19 kg/m2  SpO2 95%    Physical Exam   Constitutional: She is oriented to person, place, and time. She appears well-developed and well-nourished.   HENT:   Head: Normocephalic.   Eyes: Conjunctivae are normal.   Neck: No JVD present. No thyromegaly present.   Cardiovascular: Normal rate, regular rhythm, S1 normal, S2 normal and normal heart sounds.  Exam reveals no gallop, no S3, no S4 and no friction rub.    No murmur heard.  Pulmonary/Chest: Effort normal and breath sounds normal. No respiratory distress. She has no wheezes. She has no rales. She exhibits tenderness (tender over left chest. Palpation reproduces her pain.).   Abdominal: Soft. Bowel sounds are normal. She exhibits no distension. There is no tenderness.   Musculoskeletal: She exhibits no edema.   Minor weakness in the  left hand  strength.   Neurological: She is alert and oriented to person, place, and time.   Skin: Skin is warm and dry.   Psychiatric: She has a normal mood and affect.     Results for ZEKE ROSS (MRN [id number]) as of 5/31/2017 07:58   Ref. Range 5/26/2017 07:51   Sodium Latest Ref Range: 135-145 mmol/L 139   Potassium Latest Ref Range: 3.6-5.5 mmol/L 4.1   Chloride Latest Ref Range:  mmol/L 106   Co2 Latest Ref Range: 20-33 mmol/L 27   Anion Gap Latest Ref Range: 0.0-11.9  6.0   Glucose Latest Ref Range: 65-99 mg/dL 91   Bun Latest Ref Range: 8-22 mg/dL 15   Creatinine Latest Ref Range: 0.50-1.40 mg/dL 0.54   GFR If  Latest Ref Range: >60 mL/min/1.73 m 2 >60   GFR If Non  Latest Ref Range: >60 mL/min/1.73 m 2 >60   Calcium Latest Ref Range: 8.5-10.5 mg/dL 9.3   AST(SGOT) Latest Ref Range: 12-45 U/L 15   ALT(SGPT) Latest Ref Range: 2-50 U/L 11   Alkaline Phosphatase Latest Ref Range: 30-99 U/L 124 (H)   Total Bilirubin Latest Ref Range: 0.1-1.5 mg/dL 0.5   Albumin Latest Ref Range: 3.2-4.9 g/dL 3.9   Total Protein Latest Ref Range: 6.0-8.2 g/dL 7.0   Globulin Latest Ref Range: 1.9-3.5 g/dL 3.1   A-G Ratio Latest Units: g/dL 1.3   Cholesterol,Tot Latest Ref Range: 100-199 mg/dL 162   Triglycerides Latest Ref Range: 0-149 mg/dL 215 (H)   HDL Latest Ref Range: >=40 mg/dL 43   LDL Latest Ref Range: <100 mg/dL 76     May 16, 2017: Myocardial perfusion imaging was negative for ischemia or prior MI. Normal left ventricular size. Ejection fraction 69%.    Assessment:     1. Other chest pain     2. Numbness of left hand     3. Coronary artery disease: MIKI to LAD ×2 and 6/2016     4. Pure hypercholesterolemia         Medical Decision Making:  Today's Assessment / Status / Plan:     Chest pain: Her chest pain is clearly musculoskeletal as it can be reduced by palpation over her last chest. Her nuclear stress test was negative for ischemia. She is reassured.    Numbness of  left hand: Possibly carpal tunnel or other nerve compression. She does have minor weakness in that hand. I asked her to follow-up with her primary care. It's possible that this hand numbness is related to her work.    Hyperlipidemia: Her lipids are good with the exception of her triglycerides which are elevated. I've asked her to reduce sweets and fats in her diet. She will increase her exercise to 30 minutes 5 days a week.    She is stable enough to follow-up in 6 months with myself. Sooner if problems.    Collaborating Provider: Dr. Fatima.            No Recipients

## 2017-06-20 ENCOUNTER — GYNECOLOGY VISIT (OUTPATIENT)
Dept: OBGYN | Facility: MEDICAL CENTER | Age: 62
End: 2017-06-20
Payer: COMMERCIAL

## 2017-06-20 VITALS
WEIGHT: 128.6 LBS | BODY MASS INDEX: 25.25 KG/M2 | DIASTOLIC BLOOD PRESSURE: 70 MMHG | SYSTOLIC BLOOD PRESSURE: 122 MMHG | HEIGHT: 60 IN

## 2017-06-20 DIAGNOSIS — R10.2 PELVIC PAIN: ICD-10-CM

## 2017-06-20 DIAGNOSIS — K59.00 CONSTIPATION, UNSPECIFIED CONSTIPATION TYPE: ICD-10-CM

## 2017-06-20 PROCEDURE — 99203 OFFICE O/P NEW LOW 30 MIN: CPT | Performed by: OBSTETRICS & GYNECOLOGY

## 2017-06-20 NOTE — MR AVS SNAPSHOT
Katelynn Hart   2017 2:00 PM   Gynecology Visit   MRN: [id number]    Department:  [facility]   Dept Phone:  [phone]    Description:  Female : 1955   Provider:  Taylor Ashraf M.D.           Reason for Visit     Abdominal Pain bilateral lower abdomen      Allergies as of 2017     No Known Allergies      You were diagnosed with     Pelvic pain   [443641]         Vital Signs     Blood Pressure Height Weight Body Mass Index Breastfeeding? Smoking Status    122/70 mmHg 1.524 m (5') 58.333 kg (128 lb 9.6 oz) 25.12 kg/m2 No Never Smoker       Basic Information     Date Of Birth Sex Race Ethnicity Preferred Language    1955 Female  or   Origin (Wolof,Indian,Surinamese,Raheem, etc) English      Your appointments     2017  8:45 AM   Annual Exam with Taylor Ashraf M.D.   [facility]    [address]  [place] [address] [phone]            2017 10:20 AM   FOLLOW UP with SALUD Bobby   [facility] ([facility])    [address], [address]  [place] [address] [phone]              Problem List              ICD-10-CM Priority Class Noted - Resolved    Kidney stone N20.0 Low  2013 - Present    SBO (small bowel obstruction) (CMS-HCC) K56.69 Low  2013 - Present    NSTEMI (non-ST elevated myocardial infarction) (CMS-HCC) I21.4 Medium  6/3/2016 - Present    Obstruction of left ureteropelvic junction (UPJ) due to stone N20.1 Low  2016 - Present    Hydronephrosis, left N13.30 Low  2016 - Present    Dizziness R42 Medium  2016 - Present    Stented coronary artery Z95.5 Medium  6/10/2016 - Present    Coronary artery disease: MIKI to LAD ×2 and 2016 I25.119 Medium  2016 - Present    Dyslipidemia E78.5   5/10/2017 - Present    Numbness of left hand R20.8   2017 - Present         Health Maintenance        Date Due Completion Dates    IMM DTaP/Tdap/Td Vaccine (1 - Tdap) 8/11/1976 ---    PAP SMEAR 8/11/1978 ---    COLONOSCOPY 8/11/2007 ---    MAMMOGRAM 3/14/2013 3/14/2012            Current Immunizations     No immunizations on file.      Below and/or attached are the medications your provider expects you to take. Review all of your home medications and newly ordered medications with your provider and/or pharmacist. Follow medication instructions as directed by your provider and/or pharmacist. Please keep your medication list with you and share with your provider. Update the information when medications are discontinued, doses are changed, or new medications (including over-the-counter products) are added; and carry medication information at all times in the event of emergency situations     Allergies:  No Known Allergies          Medications  Valid as of: June 20, 2017 -  2:32 PM    Generic Name Brand Name Tablet Size Instructions for use    Aspirin (Tablet Delayed Response) ECOTRIN 81 MG Take 1 Tab by mouth every day.        Atorvastatin Calcium (Tab) LIPITOR 80 MG Take 1 Tab by mouth every day.        Metoprolol Succinate (TABLET SR 24 HR) TOPROL XL 25 MG Take 1 Tab by mouth every day.        Multiple Vitamin (Tab) THERAGRAN  Take 1 Tab by mouth every day.        Nitroglycerin (SL Tab) NITROSTAT 0.4 MG Place 1 Tab under tongue as needed for Chest Pain.        Omega-3 Fatty Acids   Take 1 Cap by mouth every day.        .                 Medicines prescribed today were sent to:     [facility] [address] [place] [address] [address] [address] [facility] [address]    Phone: [phone] Fax: [phone]    Open 24 Hours?: No      Medication refill instructions:       If your prescription bottle indicates you have medication refills left, it is not necessary to call your provider’s office. Please contact your pharmacy and they will refill your medication.    If your  prescription bottle indicates you do not have any refills left, you may request refills at any time through one of the following ways: The online [employer] system (except Urgent Care), by calling your provider’s office, or by asking your pharmacy to contact your provider’s office with a refill request. Medication refills are processed only during regular business hours and may not be available until the next business day. Your provider may request additional information or to have a follow-up visit with you prior to refilling your medication.   *Please Note: Medication refills are assigned a new Rx number when refilled electronically. Your pharmacy may indicate that no refills were authorized even though a new prescription for the same medication is available at the pharmacy. Please request the medicine by name with the pharmacy before contacting your provider for a refill.           Goldbely Access Code: Activation code not generated  Current [employer]bely Status: Active

## 2017-06-23 ENCOUNTER — TELEPHONE (OUTPATIENT)
Dept: OBGYN | Facility: MEDICAL CENTER | Age: 62
End: 2017-06-23

## 2017-06-23 NOTE — Clinical Note
06/23/2017          Katelynn Ibanez De Shaffer        Llámenos por favor tenemos dylan informacion pendiente con respecto a moreno cuidado medico.  Dylan de las enfermeras está disponible para hablar con usted de Lunes a Viernes de 8 a.m. - 12 p.m. o de 1 p.m. - 5 p.m.    Llamenos por favor al [phone]; y radha por moreno pronta atención.          Sinceramente,          Sourav Menjivar Ass't    Firmado Electrónicamente

## 2017-06-23 NOTE — TELEPHONE ENCOUNTER
----- Message from Taylor Ashraf M.D. sent at 6/20/2017  7:07 PM PDT -----  Pt is Faroese only speaking  Kindly inform her that I placed a referral for GI - regarding lower abdominal pain and significant constipation  Thanks    Called pt and unable to leave message a VM is not set up, will be sending letter today.

## 2017-09-06 DIAGNOSIS — Z95.5 STENTED CORONARY ARTERY: ICD-10-CM

## 2017-09-06 DIAGNOSIS — I21.4 NSTEMI (NON-ST ELEVATED MYOCARDIAL INFARCTION) (HCC): ICD-10-CM

## 2017-09-11 DIAGNOSIS — I10 ESSENTIAL HYPERTENSION: ICD-10-CM

## 2017-09-11 RX ORDER — METOPROLOL SUCCINATE 25 MG/1
25 TABLET, EXTENDED RELEASE ORAL DAILY
Qty: 90 TAB | Refills: 3 | OUTPATIENT
Start: 2017-09-11 | End: 2018-02-22

## 2017-09-11 NOTE — TELEPHONE ENCOUNTER
Patient came into the office requesting a refill on Metoprolol Succinate.    Prescription for Metoprolol Succinate 25 mg daily called in to [facility].    OMKAR LUQUE

## 2018-02-22 ENCOUNTER — HOSPITAL ENCOUNTER (OUTPATIENT)
Dept: RADIOLOGY | Facility: MEDICAL CENTER | Age: 63
End: 2018-02-22
Attending: NURSE PRACTITIONER
Payer: COMMERCIAL

## 2018-02-22 ENCOUNTER — OFFICE VISIT (OUTPATIENT)
Dept: URGENT CARE | Facility: PHYSICIAN GROUP | Age: 63
End: 2018-02-22
Payer: COMMERCIAL

## 2018-02-22 VITALS
HEART RATE: 84 BPM | SYSTOLIC BLOOD PRESSURE: 142 MMHG | WEIGHT: 138 LBS | HEIGHT: 60 IN | DIASTOLIC BLOOD PRESSURE: 90 MMHG | TEMPERATURE: 97.9 F | OXYGEN SATURATION: 96 % | BODY MASS INDEX: 27.09 KG/M2

## 2018-02-22 DIAGNOSIS — M25.572 ACUTE LEFT ANKLE PAIN: ICD-10-CM

## 2018-02-22 DIAGNOSIS — M25.472 LEFT ANKLE SWELLING: ICD-10-CM

## 2018-02-22 DIAGNOSIS — M79.662 PAIN OF LEFT CALF: ICD-10-CM

## 2018-02-22 DIAGNOSIS — M65.9 TENOSYNOVITIS OF ANKLE: ICD-10-CM

## 2018-02-22 PROCEDURE — 99214 OFFICE O/P EST MOD 30 MIN: CPT | Performed by: NURSE PRACTITIONER

## 2018-02-22 PROCEDURE — 93971 EXTREMITY STUDY: CPT | Mod: LT

## 2018-02-22 ASSESSMENT — ENCOUNTER SYMPTOMS
CHILLS: 0
INABILITY TO BEAR WEIGHT: 0
SHORTNESS OF BREATH: 0
FEVER: 0
DIZZINESS: 0
TINGLING: 0
EYE PAIN: 0
NAUSEA: 0
LEG PAIN: 1
JOINT SWELLING: 1
NUMBNESS: 0
MYALGIAS: 0
SORE THROAT: 0
VOMITING: 0
LOSS OF SENSATION: 0
WEAKNESS: 0

## 2018-02-22 NOTE — LETTER
February 22, 2018         Patient: Katelynn Hart   YOB: 1955   Date of Visit: 2/22/2018           To Whom it May Concern:    Katelynn Shaffer was seen in my clinic on 2/22/2018. She may return to work on with modified duty with frequent breaks to elevate left lower extremity until evaluated with specialist.      If you have any questions or concerns, please don't hesitate to call.        Sincerely,           MEGHANN Melchor.  Electronically Signed

## 2018-02-23 NOTE — PROGRESS NOTES
Subjective:     Katelynn Hart is a 62 y.o. female who presents for Leg Pain (x1wks L leg pain/swelling ankle pain radiates to calf)  Patient presents to clinic today with complaints of left lower leg pain, calf pain, ankle pain. Patient states that she woke up this morning and her left leg was bluish in color and painful. Pain radiates from the inside ankle up towards to the calf. Patient is complaining of pain when walking of the left ankle and left calf. Patient denies any mechanism of injury to the left ankle. Patient works for [employer] and stand on her feet and wears steel toed shoes in which she contributes the pain to. . Patient denies any history of DVTs, Patient denies any history of gout. Patient denies any hx of tobacco use.      Leg Pain   This is a new problem. The current episode started in the past 7 days. The problem occurs constantly. The problem has been unchanged. Associated symptoms include joint swelling. Pertinent negatives include no chest pain, chills, fever, myalgias, nausea, numbness, rash, sore throat, vomiting or weakness. Associated symptoms comments: Calf pain, medial ankle pain, . The symptoms are aggravated by walking. She has tried rest and NSAIDs for the symptoms. The treatment provided no relief.   Ankle Pain    The incident occurred 5 to 7 days ago. Incident location: no injury. There was no injury mechanism. The pain is present in the left ankle and left leg. The pain is at a severity of 8/10. The pain is moderate. The pain has been constant since onset. Pertinent negatives include no inability to bear weight, loss of sensation, numbness or tingling. Associated symptoms comments: Cramping of lower calf,   . She reports no foreign bodies present. The symptoms are aggravated by weight bearing. She has tried non-weight bearing for the symptoms. The treatment provided no relief.     Past Medical History:   Diagnosis Date   • CAD (coronary artery disease) 06/2016    MIKI X 2 to  mid and distal LAD and balloon to diagonal in 12/16   • Hyperlipidemia    • Kidney stones      Past Surgical History:   Procedure Laterality Date   • CARDIAC CATH  6/5/16     2 MIKI to LAD   • ANGIOPLASTY     • LITHOTRIPSY       Social History     Social History   • Marital status:      Spouse name: N/A   • Number of children: N/A   • Years of education: N/A     Occupational History   • Not on file.     Social History Main Topics   • Smoking status: Never Smoker   • Smokeless tobacco: Never Used   • Alcohol use Yes      Comment: occ socially    • Drug use: No   • Sexual activity: Not on file     Other Topics Concern   • Not on file     Social History Narrative   • No narrative on file    No family history on file. Review of Systems   Constitutional: Negative for chills and fever.   HENT: Negative for sore throat.    Eyes: Negative for pain.   Respiratory: Negative for shortness of breath.    Cardiovascular: Negative for chest pain.   Gastrointestinal: Negative for nausea and vomiting.   Genitourinary: Negative for hematuria.   Musculoskeletal: Positive for joint pain and joint swelling. Negative for myalgias.   Skin: Negative for rash.   Neurological: Negative for dizziness, tingling, weakness and numbness.   No Known Allergies   Objective:   /90   Pulse 84   Temp 36.6 °C (97.9 °F)   Ht 1.524 m (5')   Wt 62.6 kg (138 lb)   SpO2 96%   BMI 26.95 kg/m²   Physical Exam   Constitutional: She is oriented to person, place, and time. She appears well-developed and well-nourished. No distress.   HENT:   Head: Normocephalic and atraumatic.   Eyes: Conjunctivae and EOM are normal. Pupils are equal, round, and reactive to light.   Cardiovascular: Normal rate, regular rhythm and normal pulses.    No murmur heard.  Pulses:       Popliteal pulses are 2+ on the right side, and 2+ on the left side.        Dorsalis pedis pulses are 2+ on the right side, and 2+ on the left side.        Posterior tibial pulses are 2+  on the right side, and 2+ on the left side.   Pulmonary/Chest: Effort normal and breath sounds normal. No respiratory distress.   Abdominal: Soft. She exhibits no distension. There is no tenderness.   Musculoskeletal:        Left ankle: She exhibits decreased range of motion and swelling. She exhibits normal pulse. Tenderness. Medial malleolus tenderness found. Achilles tendon normal.        Left lower leg: She exhibits tenderness.        Feet:    Decrease ROM with flexion due to pain of medial aspect of distal calf. Swelling noted at the medial malleolus. Skin not warm to the touch, without erythema. Bilateral calves symmetrical. <3 cap refill.    Neurological: She is alert and oriented to person, place, and time. She has normal reflexes. No sensory deficit.   Skin: Skin is warm and dry.   Psychiatric: She has a normal mood and affect.   Vitals reviewed.        Assessment/Plan:   Assessment    1. Acute left ankle pain  REFERRAL TO SPORTS MEDICINE    CANCELED: DX-ANKLE 3+ VIEWS LEFT   2. Left ankle swelling  US-EXTREMITY VENOUS UNILATERAL-LOWER LEFT    REFERRAL TO SPORTS MEDICINE    CANCELED: US-EXTREMITY VENOUS BILATERAL LOWER    CANCELED: US-EXTREMITY VENOUS UNILATERAL-LOWER LEFT   3. Pain of left calf  US-EXTREMITY VENOUS UNILATERAL-LOWER LEFT    CANCELED: US-EXTREMITY VENOUS BILATERAL LOWER    CANCELED: US-EXTREMITY VENOUS UNILATERAL-LOWER LEFT   4. Tenosynovitis of ankle  REFERRAL TO SPORTS MEDICINE       - US-EXTREMITY VENOUS BILATERAL LOWER; Future  Cannot rule out DVT at this time. Will get ultrasound for evaluation of left lower extremity to rule out DVT.   US.  1. No evidence of left lower extremity deep venous thrombosis.  2. Small amount of fluid surrounding the left tibialis posterior tendon could relate to tenosynovitis..     DVT ruled out and negative   We'll place patient in a left ankle  splint. Advised Relative rest, ice, nsaid prn. Elevation and compression prn swelling. Resume activity as  tolerated. Patient requesting a work note with modified duty until evaluated by specialist. Letter provided for work.    Will have patient follow up with sports medicine for further evaluation and management       Patient given precautionary s/sx that mandate immediate follow up and evaluation in the ED. Advised of risks of not doing so.    DDX, Supportive care, and indications for immediate follow-up discussed with patient.    Instructed to return to clinic or nearest emergency department if we are not available for any change in condition, further concerns, or worsening of symptoms.    The patient demonstrated a good understanding and agreed with the treatment plan.

## 2018-03-02 ENCOUNTER — OFFICE VISIT (OUTPATIENT)
Dept: MEDICAL GROUP | Facility: MEDICAL CENTER | Age: 63
End: 2018-03-02
Payer: COMMERCIAL

## 2018-03-02 VITALS
WEIGHT: 138 LBS | BODY MASS INDEX: 27.09 KG/M2 | RESPIRATION RATE: 16 BRPM | HEART RATE: 60 BPM | DIASTOLIC BLOOD PRESSURE: 88 MMHG | TEMPERATURE: 98 F | HEIGHT: 60 IN | OXYGEN SATURATION: 95 % | SYSTOLIC BLOOD PRESSURE: 120 MMHG

## 2018-03-02 DIAGNOSIS — I21.4 NSTEMI (NON-ST ELEVATED MYOCARDIAL INFARCTION) (HCC): ICD-10-CM

## 2018-03-02 DIAGNOSIS — E78.5 DYSLIPIDEMIA: ICD-10-CM

## 2018-03-02 DIAGNOSIS — Z00.00 ANNUAL PHYSICAL EXAM: ICD-10-CM

## 2018-03-02 DIAGNOSIS — Z78.0 POSTMENOPAUSAL: ICD-10-CM

## 2018-03-02 DIAGNOSIS — M65.9 TENOSYNOVITIS OF ANKLE: ICD-10-CM

## 2018-03-02 DIAGNOSIS — Z12.31 SCREENING MAMMOGRAM, ENCOUNTER FOR: ICD-10-CM

## 2018-03-02 DIAGNOSIS — I10 ESSENTIAL HYPERTENSION: ICD-10-CM

## 2018-03-02 DIAGNOSIS — Z83.3 FAMILY HISTORY OF DIABETES MELLITUS (DM): ICD-10-CM

## 2018-03-02 DIAGNOSIS — N20.0 KIDNEY STONE: ICD-10-CM

## 2018-03-02 PROCEDURE — 99214 OFFICE O/P EST MOD 30 MIN: CPT | Performed by: NURSE PRACTITIONER

## 2018-03-02 RX ORDER — METOPROLOL SUCCINATE 25 MG/1
25 TABLET, EXTENDED RELEASE ORAL DAILY
COMMUNITY
End: 2018-12-12 | Stop reason: SDUPTHER

## 2018-03-02 RX ORDER — PREDNISONE 20 MG/1
40 TABLET ORAL DAILY
Qty: 10 TAB | Refills: 0 | Status: SHIPPED | OUTPATIENT
Start: 2018-03-02 | End: 2018-03-07

## 2018-03-02 NOTE — ASSESSMENT & PLAN NOTE
Patient diagnosed in urgent care last week. She was referred to sports medicine, however has not heard back from them yet. Patient continues having left ankle swelling and pain. She has been taking naproxen as needed for pain. She is still able to walk and bear weight, however this does cause pain.

## 2018-03-02 NOTE — PROGRESS NOTES
Katelynn Hart is a 62 y.o. female here to establish care and discuss the following:    HPI:    NSTEMI (non-ST elevated myocardial infarction)  Stable. Patient continues follow-up with cardiology. Currently taking aspirin 81 mg daily, atorvastatin 80 mg daily, metoprolol SR 25 mg daily, omega-3 fatty acids daily, nitroglycerin as needed (not needed since stent placement). She denies chest pain, shortness of breath, arm or jaw pain.    Dyslipidemia  Patient taking atorvastatin 80 mg daily and omega-3 fatty acid daily.    Essential hypertension  Patient's blood pressure has been stable on metoprolol 25 mg daily. She denies chest pain, headache, dizziness. She does not check blood pressure at home.    Family history of diabetes mellitus (DM)  Patient's father and several maternal aunts and uncles are diabetic.    Tenosynovitis of ankle  Patient diagnosed in urgent care last week. She was referred to sports medicine, however has not heard back from them yet. Patient continues having left ankle swelling and pain. She has been taking naproxen as needed for pain. She is still able to walk and bear weight, however this does cause pain.    Current medicines (including changes today)  Current Outpatient Prescriptions   Medication Sig Dispense Refill   • predniSONE (DELTASONE) 20 MG Tab Take 2 Tabs by mouth every day for 5 days. 10 Tab 0   • metoprolol SR (TOPROL XL) 25 MG TABLET SR 24 HR Take 25 mg by mouth every day.     • aspirin EC (ECOTRIN) 81 MG Tablet Delayed Response Take 1 Tab by mouth every day. 90 Tab 3   • nitroglycerin (NITROSTAT) 0.4 MG SL Tab Place 1 Tab under tongue as needed for Chest Pain. 25 Tab 11   • atorvastatin (LIPITOR) 80 MG tablet Take 1 Tab by mouth every day. 90 Tab 3   • Omega-3 Fatty Acids (OMEGA 3 PO) Take 1 Cap by mouth every day.     • multivitamin (THERAGRAN) Tab Take 1 Tab by mouth every day.       No current facility-administered medications for this visit.      She  has a past  medical history of CAD (coronary artery disease) (2016); Hyperlipidemia; and Kidney stones.  She  has a past surgical history that includes lithotripsy; angioplasty; and cardiac cath (16).  Social History   Substance Use Topics   • Smoking status: Never Smoker   • Smokeless tobacco: Never Used   • Alcohol use No     Social History     Social History Narrative   • No narrative on file     Family History   Problem Relation Age of Onset   • Cancer Mother      bone cancer   • Hypertension Father    • Diabetes Father    • Hypertension Sister    • Hypertension Maternal Aunt    • Cancer Maternal Aunt    • Cancer Maternal Uncle    • Hypertension Maternal Uncle      Family Status   Relation Status   • Mother  at age 70    CA bone   • Father Alive   • Sister Alive   • Maternal Aunt Alive   • Maternal Uncle Alive         ROS  No chest pain, no abdominal pain, no rash.  Positive ROS as per HPI.  All other systems reviewed and are negative      Objective:     Blood pressure 120/88, pulse 60, temperature 36.7 °C (98 °F), resp. rate 16, height 1.524 m (5'), weight 62.6 kg (138 lb), SpO2 95 %, not currently breastfeeding. Body mass index is 26.95 kg/m².  Physical Exam:    Constitutional: Alert, no distress.  Skin: Warm, dry, good turgor, no rashes in visible areas.  Eye: Equal, round and reactive, conjunctiva clear, lids normal.  ENMT: Lips without lesions, good dentition, oropharynx clear.  Neck: Trachea midline, no masses, no thyromegaly. No cervical or supraclavicular lymphadenopathy.  Respiratory: Unlabored respiratory effort, lungs clear to auscultation, no wheezes, no ronchi.  Cardiovascular: Normal S1, S2, no murmur, no edema.  Abdomen: Soft, non-tender, no masses, no hepatosplenomegaly.  Psych: Alert and oriented x3, normal affect and mood.  MSK: Slight edema and pain with palpation of left medial ankle.      Assessment and Plan:   The following treatment plan was discussed    1. Annual physical exam  Check  labs, follow-up for annual.  - COMP METABOLIC PANEL; Future  - CBC WITH DIFFERENTIAL; Future  - HEMOGLOBIN A1C; Future  - LIPID PROFILE; Future  - MICROALBUMIN CREAT RATIO URINE; Future  - TSH WITH REFLEX TO FT4; Future  - VITAMIN D,25 HYDROXY; Future    2. Tenosynovitis of ankle  Unstable.  Awaiting referral for sports medicine.  Due to history of MI, I advised patient to stop taking naproxen.  Trial of prednisone 40 mg daily for 5 days.  - predniSONE (DELTASONE) 20 MG Tab; Take 2 Tabs by mouth every day for 5 days.  Dispense: 10 Tab; Refill: 0    3. NSTEMI (non-ST elevated myocardial infarction) (CMS-HCC)  Stable.  Continue aspirin, atorvastatin, and Toprol.  Continue follow-up with cardiology.    4. Essential hypertension  Stable.  Continue Toprol 25 mg daily.  Check labs.  - COMP METABOLIC PANEL; Future  - CBC WITH DIFFERENTIAL; Future  - MICROALBUMIN CREAT RATIO URINE; Future  - TSH WITH REFLEX TO FT4; Future    5. Dyslipidemia  Stable.  Continue atorvastatin 80 mg daily.  Continue omega-3 1000 mg daily.    6. Kidney stone  Stable.    7. Postmenopausal  DEXA scan and vitamin D ordered.  - VITAMIN D,25 HYDROXY; Future  - DS-BONE DENSITY STUDY (DEXA); Future    8. Family history of diabetes mellitus (DM)  Check A1c.  - HEMOGLOBIN A1C; Future    9. Screening mammogram, encounter for  Mammogram ordered.  - MA-MAMMO SCREENING BILAT W/EDGAR W/CAD; Future      Records requested.  Followup: Return in about 4 weeks (around 3/30/2018) for Annual, labs, ankle pain.    I have placed the below orders and discussed them with an approved delegating provider. The MA is performing the below orders under the direction of Dr. Rainey

## 2018-03-02 NOTE — ASSESSMENT & PLAN NOTE
Patient's blood pressure has been stable on metoprolol 25 mg daily. She denies chest pain, headache, dizziness. She does not check blood pressure at home.

## 2018-03-02 NOTE — ASSESSMENT & PLAN NOTE
Stable. Patient continues follow-up with cardiology. Currently taking aspirin 81 mg daily, atorvastatin 80 mg daily, metoprolol SR 25 mg daily, omega-3 fatty acids daily, nitroglycerin as needed (not needed since stent placement). She denies chest pain, shortness of breath, arm or jaw pain.

## 2018-03-02 NOTE — LETTER
March 2, 2018       Patient: Katelynn Hart   YOB: 1955   Date of Visit: 3/2/2018         To Whom It May Concern:    It is my medical opinion that Katelynn Shaffer return to work on Monday 3/5/2018.    If you have any questions or concerns, please don't hesitate to call [phone]          Sincerely,          Rebekah Lozada A.P.R.N.  Electronically Signed

## 2018-03-20 ENCOUNTER — APPOINTMENT (OUTPATIENT)
Dept: RADIOLOGY | Facility: MEDICAL CENTER | Age: 63
End: 2018-03-20
Attending: NURSE PRACTITIONER
Payer: COMMERCIAL

## 2018-03-24 ENCOUNTER — HOSPITAL ENCOUNTER (OUTPATIENT)
Dept: LAB | Facility: MEDICAL CENTER | Age: 63
End: 2018-03-24
Attending: NURSE PRACTITIONER
Payer: COMMERCIAL

## 2018-03-24 DIAGNOSIS — Z83.3 FAMILY HISTORY OF DIABETES MELLITUS (DM): ICD-10-CM

## 2018-03-24 DIAGNOSIS — I10 ESSENTIAL HYPERTENSION: ICD-10-CM

## 2018-03-24 DIAGNOSIS — Z00.00 ANNUAL PHYSICAL EXAM: ICD-10-CM

## 2018-03-24 DIAGNOSIS — Z78.0 POSTMENOPAUSAL: ICD-10-CM

## 2018-03-24 LAB
25(OH)D3 SERPL-MCNC: 17 NG/ML (ref 30–100)
ALBUMIN SERPL BCP-MCNC: 4.7 G/DL (ref 3.2–4.9)
ALBUMIN/GLOB SERPL: 1.5 G/DL
ALP SERPL-CCNC: 111 U/L (ref 30–99)
ALT SERPL-CCNC: 16 U/L (ref 2–50)
ANION GAP SERPL CALC-SCNC: 9 MMOL/L (ref 0–11.9)
AST SERPL-CCNC: 19 U/L (ref 12–45)
BASOPHILS # BLD AUTO: 0.6 % (ref 0–1.8)
BASOPHILS # BLD: 0.04 K/UL (ref 0–0.12)
BILIRUB SERPL-MCNC: 0.7 MG/DL (ref 0.1–1.5)
BUN SERPL-MCNC: 21 MG/DL (ref 8–22)
CALCIUM SERPL-MCNC: 10.1 MG/DL (ref 8.5–10.5)
CHLORIDE SERPL-SCNC: 105 MMOL/L (ref 96–112)
CHOLEST SERPL-MCNC: 273 MG/DL (ref 100–199)
CO2 SERPL-SCNC: 26 MMOL/L (ref 20–33)
CREAT SERPL-MCNC: 0.59 MG/DL (ref 0.5–1.4)
CREAT UR-MCNC: 125.6 MG/DL
EOSINOPHIL # BLD AUTO: 0 K/UL (ref 0–0.51)
EOSINOPHIL NFR BLD: 0 % (ref 0–6.9)
ERYTHROCYTE [DISTWIDTH] IN BLOOD BY AUTOMATED COUNT: 40.8 FL (ref 35.9–50)
EST. AVERAGE GLUCOSE BLD GHB EST-MCNC: 117 MG/DL
GLOBULIN SER CALC-MCNC: 3.2 G/DL (ref 1.9–3.5)
GLUCOSE SERPL-MCNC: 102 MG/DL (ref 65–99)
HBA1C MFR BLD: 5.7 % (ref 0–5.6)
HCT VFR BLD AUTO: 46.3 % (ref 37–47)
HDLC SERPL-MCNC: 53 MG/DL
HGB BLD-MCNC: 15 G/DL (ref 12–16)
IMM GRANULOCYTES # BLD AUTO: 0.01 K/UL (ref 0–0.11)
IMM GRANULOCYTES NFR BLD AUTO: 0.2 % (ref 0–0.9)
LDLC SERPL CALC-MCNC: 189 MG/DL
LYMPHOCYTES # BLD AUTO: 2.66 K/UL (ref 1–4.8)
LYMPHOCYTES NFR BLD: 41.3 % (ref 22–41)
MCH RBC QN AUTO: 28.6 PG (ref 27–33)
MCHC RBC AUTO-ENTMCNC: 32.4 G/DL (ref 33.6–35)
MCV RBC AUTO: 88.4 FL (ref 81.4–97.8)
MICROALBUMIN UR-MCNC: 1.5 MG/DL
MICROALBUMIN/CREAT UR: 12 MG/G (ref 0–30)
MONOCYTES # BLD AUTO: 0.32 K/UL (ref 0–0.85)
MONOCYTES NFR BLD AUTO: 5 % (ref 0–13.4)
NEUTROPHILS # BLD AUTO: 3.41 K/UL (ref 2–7.15)
NEUTROPHILS NFR BLD: 52.9 % (ref 44–72)
NRBC # BLD AUTO: 0 K/UL
NRBC BLD-RTO: 0 /100 WBC
PLATELET # BLD AUTO: 318 K/UL (ref 164–446)
PMV BLD AUTO: 10.9 FL (ref 9–12.9)
POTASSIUM SERPL-SCNC: 4.1 MMOL/L (ref 3.6–5.5)
PROT SERPL-MCNC: 7.9 G/DL (ref 6–8.2)
RBC # BLD AUTO: 5.24 M/UL (ref 4.2–5.4)
SODIUM SERPL-SCNC: 140 MMOL/L (ref 135–145)
TRIGL SERPL-MCNC: 157 MG/DL (ref 0–149)
TSH SERPL DL<=0.005 MIU/L-ACNC: 1.87 UIU/ML (ref 0.38–5.33)
WBC # BLD AUTO: 6.4 K/UL (ref 4.8–10.8)

## 2018-03-24 PROCEDURE — 82570 ASSAY OF URINE CREATININE: CPT

## 2018-03-24 PROCEDURE — 85025 COMPLETE CBC W/AUTO DIFF WBC: CPT

## 2018-03-24 PROCEDURE — 82043 UR ALBUMIN QUANTITATIVE: CPT

## 2018-03-24 PROCEDURE — 84443 ASSAY THYROID STIM HORMONE: CPT

## 2018-03-24 PROCEDURE — 36415 COLL VENOUS BLD VENIPUNCTURE: CPT

## 2018-03-24 PROCEDURE — 83036 HEMOGLOBIN GLYCOSYLATED A1C: CPT

## 2018-03-24 PROCEDURE — 82306 VITAMIN D 25 HYDROXY: CPT

## 2018-03-24 PROCEDURE — 80053 COMPREHEN METABOLIC PANEL: CPT

## 2018-03-24 PROCEDURE — 80061 LIPID PANEL: CPT

## 2018-03-26 ENCOUNTER — TELEPHONE (OUTPATIENT)
Dept: MEDICAL GROUP | Facility: MEDICAL CENTER | Age: 63
End: 2018-03-26

## 2018-03-26 NOTE — TELEPHONE ENCOUNTER
----- Message from CEDRICK Rojas sent at 3/26/2018 10:04 AM PDT -----  Please notify patient that her urine protein, thyroid function, blood counts, electrolytes, and kidney and liver function were normal.    Her diabetes testing did show that she is in the prediabetic range.    Her vitamin D is moderately low at 17, this should be between 30 and 100.    Her triglycerides are improved from 10 months ago, currently 157 down from 215.    Her LDL, bad cholesterol is significantly increased from 10 months ago, currently 189 up from 76.    We will discuss these labs and treatment options more in depth at her upcoming appointment next Friday.      CEDRICK Rojas

## 2018-04-19 ENCOUNTER — TELEPHONE (OUTPATIENT)
Dept: MEDICAL GROUP | Facility: MEDICAL CENTER | Age: 63
End: 2018-04-19

## 2018-04-19 NOTE — TELEPHONE ENCOUNTER
----- Message from CEDRICK Rojas sent at 4/18/2018  4:27 PM PDT -----  Please schedule patient for follow up appointment to discuss lab results and bone density results. She did not show up to her prior appointment. This is not urgent.   CEDRICK Rojas

## 2018-04-19 NOTE — TELEPHONE ENCOUNTER
1. Caller Name: patient                                         Call Back Number: [phone] (home)      spoke with patient mammogram results were normal and to schedule next one in 1 year

## 2018-04-19 NOTE — TELEPHONE ENCOUNTER
----- Message from Caitlyn Yuen sent at 4/19/2018  2:46 PM PDT -----  Patient daughter returned call and has scheduled an appt

## 2018-04-25 ENCOUNTER — TELEPHONE (OUTPATIENT)
Dept: MEDICAL GROUP | Facility: MEDICAL CENTER | Age: 63
End: 2018-04-25

## 2018-05-03 ENCOUNTER — OFFICE VISIT (OUTPATIENT)
Dept: MEDICAL GROUP | Facility: MEDICAL CENTER | Age: 63
End: 2018-05-03
Payer: COMMERCIAL

## 2018-05-03 VITALS
TEMPERATURE: 97.9 F | BODY MASS INDEX: 27.09 KG/M2 | RESPIRATION RATE: 18 BRPM | SYSTOLIC BLOOD PRESSURE: 126 MMHG | HEIGHT: 60 IN | WEIGHT: 138 LBS | HEART RATE: 69 BPM | OXYGEN SATURATION: 96 % | DIASTOLIC BLOOD PRESSURE: 80 MMHG

## 2018-05-03 DIAGNOSIS — M65.9 TENOSYNOVITIS OF ANKLE: ICD-10-CM

## 2018-05-03 DIAGNOSIS — R74.8 ELEVATED ALKALINE PHOSPHATASE LEVEL: ICD-10-CM

## 2018-05-03 DIAGNOSIS — M81.0 POSTMENOPAUSAL BONE LOSS: ICD-10-CM

## 2018-05-03 DIAGNOSIS — I25.119 CORONARY ARTERY DISEASE INVOLVING NATIVE CORONARY ARTERY OF NATIVE HEART WITH ANGINA PECTORIS (HCC): ICD-10-CM

## 2018-05-03 DIAGNOSIS — R73.03 PREDIABETES: ICD-10-CM

## 2018-05-03 DIAGNOSIS — E55.9 VITAMIN D DEFICIENCY: ICD-10-CM

## 2018-05-03 DIAGNOSIS — E78.5 DYSLIPIDEMIA: ICD-10-CM

## 2018-05-03 PROCEDURE — 99214 OFFICE O/P EST MOD 30 MIN: CPT | Performed by: NURSE PRACTITIONER

## 2018-05-03 RX ORDER — IBUPROFEN 200 MG
1000 CAPSULE ORAL DAILY
Qty: 90 TAB | Refills: 3 | Status: SHIPPED | OUTPATIENT
Start: 2018-05-03 | End: 2020-11-03

## 2018-05-03 RX ORDER — ATORVASTATIN CALCIUM 80 MG/1
80 TABLET, FILM COATED ORAL DAILY
Qty: 90 TAB | Refills: 3 | Status: SHIPPED | OUTPATIENT
Start: 2018-05-03 | End: 2020-09-17 | Stop reason: SDUPTHER

## 2018-05-03 ASSESSMENT — PATIENT HEALTH QUESTIONNAIRE - PHQ9: CLINICAL INTERPRETATION OF PHQ2 SCORE: 0

## 2018-05-03 NOTE — ASSESSMENT & PLAN NOTE
She was previously on atorvastatin 80 mg, she was advised to stop this as her cholesterol levels were improved. She stopped this and had her cholesterol levels rechecked and they remained normal. Lipid panel is now significantly elevated. She states her diet has not changed however she does eat a moderate amount of carbohydrates and sugar. She eats 2-3 tortillas with every meal as well as rice with every meal along with red meat and moderate saturated fat.

## 2018-05-03 NOTE — ASSESSMENT & PLAN NOTE
Has not seen Dr. Mejia, did not receive a phone call to schedule. She is still having pain with walking. Has been taking Advil as needed for the pain. Degree of pain is the same, not worsening.

## 2018-05-04 NOTE — PROGRESS NOTES
Subjective:   Katelynn Hart is a 62 y.o. female here today for follow-up on labs and ankle:    Tenosynovitis of ankle  Has not seen Dr. Mejia, did not receive a phone call to schedule. She is still having pain with walking. Has been taking Advil as needed for the pain. Degree of pain is the same, not worsening.     Dyslipidemia  She was previously on atorvastatin 80 mg, she was advised to stop this as her cholesterol levels were improved. She stopped this and had her cholesterol levels rechecked and they remained normal. Lipid panel is now significantly elevated. She states her diet has not changed however she does eat a moderate amount of carbohydrates and sugar. She eats 2-3 tortillas with every meal as well as rice with every meal along with red meat and moderate saturated fat.       Current medicines (including changes today)  Current Outpatient Prescriptions   Medication Sig Dispense Refill   • atorvastatin (LIPITOR) 80 MG tablet Take 1 Tab by mouth every day. 90 Tab 3   • Cholecalciferol (VITAMIND D3) 1000 UNIT Tab Take 1 Tab by mouth every day. 90 Tab 3   • calcium carbonate (OS-CARLOS 500) 1250 (500 Ca) MG Tab Take 2 Tabs by mouth every day. 90 Tab 3   • Omega-3 Fatty Acids (OMEGA 3 PO) Take 1 Cap by mouth every day.     • multivitamin (THERAGRAN) Tab Take 1 Tab by mouth every day.     • metoprolol SR (TOPROL XL) 25 MG TABLET SR 24 HR Take 25 mg by mouth every day.     • aspirin EC (ECOTRIN) 81 MG Tablet Delayed Response Take 1 Tab by mouth every day. 90 Tab 3   • nitroglycerin (NITROSTAT) 0.4 MG SL Tab Place 1 Tab under tongue as needed for Chest Pain. 25 Tab 11     No current facility-administered medications for this visit.      She  has a past medical history of CAD (coronary artery disease) (06/2016); Hyperlipidemia; and Kidney stones.    ROS   No chest pain, no shortness of breath, no abdominal pain  Positive ROS as per HPI.  All other systems reviewed and are negative.     Objective:      Blood pressure 126/80, pulse 69, temperature 36.6 °C (97.9 °F), resp. rate 18, height 1.524 m (5'), weight 62.6 kg (138 lb), SpO2 96 %. Body mass index is 26.95 kg/m².     Physical Exam:  Constitutional: Alert, no distress.  Skin: Warm, dry, good turgor, no rashes in visible areas.  Eye: Equal, round and reactive, conjunctiva clear, lids normal.  ENMT: Lips without lesions, good dentition, oropharynx clear.  Neck: Trachea midline, no masses, no thyromegaly. No cervical or supraclavicular lymphadenopathy  Respiratory: Unlabored respiratory effort, lungs clear to auscultation, no wheezes, no ronchi.  Cardiovascular: Normal S1, S2, no murmur, no edema.  Abdomen: Soft, non-tender, no masses, no hepatosplenomegaly.  Psych: Alert and oriented x3, normal affect and mood.        Assessment and Plan:   The following treatment plan was discussed    1. Tenosynovitis of ankle  Unstable.  Phone number given to patient for Dr. Lynch office to call and schedule an appointment today. Patient verbalized understanding.    2. Dyslipidemia  Unstable.  Discussed dietary changes with patient. She would rather make smaller lifestyle dietary changes and be restarted on her medication.  Restart atorvastatin 80 mg daily.  Recheck lipids in 3 months.  - atorvastatin (LIPITOR) 80 MG tablet; Take 1 Tab by mouth every day.  Dispense: 90 Tab; Refill: 3  - LIPID PROFILE; Future    3. Coronary artery disease involving native coronary artery of native heart with angina pectoris (HCC)  Stable.  Continue atorvastatin 80 mg daily.  Continue aspirin 81 mg daily.  Continue metoprolol 25 mg daily.  - atorvastatin (LIPITOR) 80 MG tablet; Take 1 Tab by mouth every day.  Dispense: 90 Tab; Refill: 3    4. Elevated alkaline phosphatase level  Unstable.  Slightly improved from labs last year.  Recheck alk phos in 3 months along with GGT.    - COMP METABOLIC PANEL; Future  - GAMMA GT (GGT); Future    5. Prediabetes  A1c 5.7.  Dietary modifications  discussed and encouraged.  - HEMOGLOBIN A1C; Future    6. Vitamin D deficiency  Unstable.  Begin vitamin D 3 1000 IU daily.  Recheck vitamin D in 3 months.  - Cholecalciferol (VITAMIND D3) 1000 UNIT Tab; Take 1 Tab by mouth every day.  Dispense: 90 Tab; Refill: 3  - VITAMIN D,25 HYDROXY; Future    7. Postmenopausal bone loss  Patient's bone density was normal however she did have a significant decrease in her bone density from prior study.  Begin vitamin D3 1000 IU daily.  Begin Calcium 1250 mg twice daily  -calcium carbonate (OS-CARLOS 500) 1250 (500 Ca) MG Tab; Take 2 Tabs by mouth every day.  Dispense: 90 Tab; Refill: 3      Followup: Return in about 3 months (around 8/3/2018) for Cholesterol, pre-diabetes, ankle.    I have placed the below orders and discussed them with an approved delegating provider. The MA is performing the below orders under the direction of Dr. Rainey

## 2018-12-12 DIAGNOSIS — I10 ESSENTIAL HYPERTENSION: Primary | ICD-10-CM

## 2018-12-12 RX ORDER — METOPROLOL SUCCINATE 25 MG/1
25 TABLET, EXTENDED RELEASE ORAL DAILY
Qty: 30 TAB | Refills: 0 | Status: SHIPPED | OUTPATIENT
Start: 2018-12-12 | End: 2018-12-14 | Stop reason: SDUPTHER

## 2018-12-14 DIAGNOSIS — I10 ESSENTIAL HYPERTENSION: ICD-10-CM

## 2018-12-14 RX ORDER — METOPROLOL SUCCINATE 25 MG/1
25 TABLET, EXTENDED RELEASE ORAL DAILY
Qty: 30 TAB | Refills: 0 | Status: SHIPPED | OUTPATIENT
Start: 2018-12-14 | End: 2018-12-17 | Stop reason: SDUPTHER

## 2018-12-17 ENCOUNTER — OFFICE VISIT (OUTPATIENT)
Dept: CARDIOLOGY | Facility: MEDICAL CENTER | Age: 63
End: 2018-12-17
Payer: COMMERCIAL

## 2018-12-17 VITALS
SYSTOLIC BLOOD PRESSURE: 158 MMHG | DIASTOLIC BLOOD PRESSURE: 88 MMHG | OXYGEN SATURATION: 97 % | WEIGHT: 137 LBS | HEART RATE: 62 BPM | BODY MASS INDEX: 26.9 KG/M2 | HEIGHT: 60 IN

## 2018-12-17 DIAGNOSIS — E78.5 DYSLIPIDEMIA: ICD-10-CM

## 2018-12-17 DIAGNOSIS — I10 ESSENTIAL HYPERTENSION: ICD-10-CM

## 2018-12-17 DIAGNOSIS — E55.9 VITAMIN D DEFICIENCY: ICD-10-CM

## 2018-12-17 DIAGNOSIS — I25.119 CORONARY ARTERY DISEASE INVOLVING NATIVE CORONARY ARTERY OF NATIVE HEART WITH ANGINA PECTORIS (HCC): Primary | ICD-10-CM

## 2018-12-17 PROCEDURE — 99214 OFFICE O/P EST MOD 30 MIN: CPT | Performed by: NURSE PRACTITIONER

## 2018-12-17 RX ORDER — METOPROLOL SUCCINATE 25 MG/1
25 TABLET, EXTENDED RELEASE ORAL DAILY
Qty: 90 TAB | Refills: 3 | Status: SHIPPED | OUTPATIENT
Start: 2018-12-17 | End: 2020-09-17 | Stop reason: SDUPTHER

## 2018-12-17 ASSESSMENT — ENCOUNTER SYMPTOMS
COUGH: 0
MYALGIAS: 0
PND: 0
ORTHOPNEA: 0
CLAUDICATION: 0
WEAKNESS: 0
SHORTNESS OF BREATH: 0
ABDOMINAL PAIN: 0
DIZZINESS: 0
PALPITATIONS: 0

## 2018-12-17 NOTE — LETTER
[facility]   [address] , [facility] [address]  [place] [address]  Phone: [phone]  Fax: [phone]              Katelynn Hart  1955    Encounter Date: 12/17/2018    SALUD Bobby          PROGRESS NOTE:  Chief Complaint   Patient presents with   • Edema       Subjective:   Katelynn Hart is a 63 y.o. Northern Irish-speaking female who presents today with her daughter, Cat who helps with translation.    The patient is here to follow-up on coronary artery disease.  She was last seen in May 2017 by myself.    According to her primary care provider's note, atorvastatin was stopped due to her lipids being in the normal range.  Last lipid panel that I have was done in March 2018 with an LDL of 189.  It is unclear whether the patient was on the atorvastatin at this time.  It appears that she may have been off of the medication.  However it has been restarted and the patient tells me she is taking atorvastatin 80 mg.    She has a rare sharp stabbing pains to her chest that lasts only seconds.  She complains of pain in her left foot for a year.  Otherwise she feels generally well.  She walks for exercise and tolerates it without any chest symptoms.    Past Medical History:   Diagnosis Date   • CAD (coronary artery disease) 06/2016    MIKI X 2 to mid and distal LAD and balloon to diagonal in 12/16   • Hyperlipidemia    • Kidney stones      Past Surgical History:   Procedure Laterality Date   • CARDIAC CATH  6/5/16     2 MIKI to LAD   • ANGIOPLASTY     • LITHOTRIPSY       Family History   Problem Relation Age of Onset   • Cancer Mother         bone cancer   • Hypertension Father    • Diabetes Father    • Hypertension Sister    • Hypertension Maternal Aunt    • Cancer Maternal Aunt    • Cancer Maternal Uncle    • Hypertension Maternal Uncle      Social History     Social History   • Marital status:      Spouse name: N/A   • Number of children: N/A      • Years of education: N/A     Occupational History   • Not on file.     Social History Main Topics   • Smoking status: Never Smoker   • Smokeless tobacco: Never Used   • Alcohol use No   • Drug use: No   • Sexual activity: Yes     Partners: Male     Other Topics Concern   • Not on file     Social History Narrative   • No narrative on file     No Known Allergies  Outpatient Encounter Prescriptions as of 12/17/2018   Medication Sig Dispense Refill   • metoprolol SR (TOPROL XL) 25 MG TABLET SR 24 HR Take 1 Tab by mouth every day. 90 Tab 3   • [DISCONTINUED] metoprolol SR (TOPROL XL) 25 MG TABLET SR 24 HR Take 1 Tab by mouth every day. Needs to be seen for further refills. Thank you 30 Tab 0   • atorvastatin (LIPITOR) 80 MG tablet Take 1 Tab by mouth every day. 90 Tab 3   • Cholecalciferol (VITAMIND D3) 1000 UNIT Tab Take 1 Tab by mouth every day. 90 Tab 3   • calcium carbonate (OS-CARLOS 500) 1250 (500 Ca) MG Tab Take 2 Tabs by mouth every day. 90 Tab 3   • aspirin EC (ECOTRIN) 81 MG Tablet Delayed Response Take 1 Tab by mouth every day. 90 Tab 3   • nitroglycerin (NITROSTAT) 0.4 MG SL Tab Place 1 Tab under tongue as needed for Chest Pain. 25 Tab 11   • Omega-3 Fatty Acids (OMEGA 3 PO) Take 1 Cap by mouth every day.     • multivitamin (THERAGRAN) Tab Take 1 Tab by mouth every day.       No facility-administered encounter medications on file as of 12/17/2018.      Review of Systems   Constitutional: Negative for malaise/fatigue.   Respiratory: Negative for cough and shortness of breath.    Cardiovascular: Negative for chest pain, palpitations, orthopnea, claudication, leg swelling and PND.   Gastrointestinal: Negative for abdominal pain.   Musculoskeletal: Positive for joint pain (pain left foot.). Negative for myalgias.   Neurological: Negative for dizziness and weakness.        Objective:   /88 (BP Location: Left arm, Patient Position: Sitting, BP Cuff Size: Adult)   Pulse 62   Ht 1.524 m (5')   Wt 62.1 kg  (137 lb)   SpO2 97%   BMI 26.76 kg/m²      Physical Exam   Constitutional: She appears well-developed and well-nourished.   HENT:   Head: Normocephalic.   Eyes: EOM are normal.   Neck: No JVD present.   Cardiovascular: Normal rate, regular rhythm and normal heart sounds.    Pulmonary/Chest: Effort normal and breath sounds normal.   Abdominal: Soft. Bowel sounds are normal.   Musculoskeletal: She exhibits no edema.   Neurological: She is alert.   Skin: Skin is warm and dry.   Psychiatric: She has a normal mood and affect.     Results for ZEKE ROSS (MRN [id number])    Ref. Range 3/24/2018 09:52   WBC Latest Ref Range: 4.8 - 10.8 K/uL 6.4   RBC Latest Ref Range: 4.20 - 5.40 M/uL 5.24   Hemoglobin Latest Ref Range: 12.0 - 16.0 g/dL 15.0   Hematocrit Latest Ref Range: 37.0 - 47.0 % 46.3   MCV Latest Ref Range: 81.4 - 97.8 fL 88.4   MCH Latest Ref Range: 27.0 - 33.0 pg 28.6   MCHC Latest Ref Range: 33.6 - 35.0 g/dL 32.4 (L)   RDW Latest Ref Range: 35.9 - 50.0 fL 40.8   Platelet Count Latest Ref Range: 164 - 446 K/uL 318   MPV Latest Ref Range: 9.0 - 12.9 fL 10.9   Neutrophils-Polys Latest Ref Range: 44.00 - 72.00 % 52.90   Neutrophils (Absolute) Latest Ref Range: 2.00 - 7.15 K/uL 3.41   Lymphocytes Latest Ref Range: 22.00 - 41.00 % 41.30 (H)   Lymphs (Absolute) Latest Ref Range: 1.00 - 4.80 K/uL 2.66   Monocytes Latest Ref Range: 0.00 - 13.40 % 5.00   Monos (Absolute) Latest Ref Range: 0.00 - 0.85 K/uL 0.32   Eosinophils Latest Ref Range: 0.00 - 6.90 % 0.00   Eos (Absolute) Latest Ref Range: 0.00 - 0.51 K/uL 0.00   Basophils Latest Ref Range: 0.00 - 1.80 % 0.60   Baso (Absolute) Latest Ref Range: 0.00 - 0.12 K/uL 0.04   Immature Granulocytes Latest Ref Range: 0.00 - 0.90 % 0.20   Immature Granulocytes (abs) Latest Ref Range: 0.00 - 0.11 K/uL 0.01   Nucleated RBC Latest Units: /100 WBC 0.00   NRBC (Absolute) Latest Units: K/uL 0.00   Sodium Latest Ref Range: 135 - 145 mmol/L 140   Potassium Latest Ref Range:  3.6 - 5.5 mmol/L 4.1   Chloride Latest Ref Range: 96 - 112 mmol/L 105   Co2 Latest Ref Range: 20 - 33 mmol/L 26   Anion Gap Latest Ref Range: 0.0 - 11.9  9.0   Glucose Latest Ref Range: 65 - 99 mg/dL 102 (H)   Bun Latest Ref Range: 8 - 22 mg/dL 21   Creatinine Latest Ref Range: 0.50 - 1.40 mg/dL 0.59   GFR If  Latest Ref Range: >60 mL/min/1.73 m 2 >60   GFR If Non  Latest Ref Range: >60 mL/min/1.73 m 2 >60   Calcium Latest Ref Range: 8.5 - 10.5 mg/dL 10.1   AST(SGOT) Latest Ref Range: 12 - 45 U/L 19   ALT(SGPT) Latest Ref Range: 2 - 50 U/L 16   Alkaline Phosphatase Latest Ref Range: 30 - 99 U/L 111 (H)   Total Bilirubin Latest Ref Range: 0.1 - 1.5 mg/dL 0.7   Albumin Latest Ref Range: 3.2 - 4.9 g/dL 4.7   Total Protein Latest Ref Range: 6.0 - 8.2 g/dL 7.9   Globulin Latest Ref Range: 1.9 - 3.5 g/dL 3.2   A-G Ratio Latest Units: g/dL 1.5   Glycohemoglobin Latest Ref Range: 0.0 - 5.6 % 5.7 (H)   Estim. Avg Glu Latest Units: mg/dL 117   Cholesterol,Tot Latest Ref Range: 100 - 199 mg/dL 273 (H)   Triglycerides Latest Ref Range: 0 - 149 mg/dL 157 (H)   HDL Latest Ref Range: >=40 mg/dL 53   LDL Latest Ref Range: <100 mg/dL 189 (H)   Micro Alb Creat Ratio Latest Ref Range: 0 - 30 mg/g 12   Creatinine, Urine Latest Units: mg/dL 125.60   Microalbumin, Urine Random Latest Units: mg/dL 1.5   25-Hydroxy   Vitamin D 25 Latest Ref Range: 30 - 100 ng/mL 17 (L)   TSH Latest Ref Range: 0.380 - 5.330 uIU/mL 1.870     Assessment:     1. Coronary artery disease: MIKI to LAD ×2 and 6/2016     2. Essential hypertension  metoprolol SR (TOPROL XL) 25 MG TABLET SR 24 HR   3. Dyslipidemia  LIPID PANEL   4. Vitamin D deficiency         Medical Decision Making:  Today's Assessment / Status / Plan:     Coronary artery disease: Status post stenting in the past.  She has no anginal symptoms.  She is encouraged to continue with her walking for exercise.    Hypertension: Her blood pressure is elevated in the office  today.  She ran out of metoprolol several months ago and has not been taking it.  We will have her restart the metoprolol 25 mg daily and have her follow-up with primary care for a blood pressure check in approximately 4-6 weeks.     Dyslipidemia: Her last lab work which was done in March of this year was very elevated.  Since she has coronary artery disease her LDL goal is 70 or less it appears she may have been off the atorvastatin at the time the lab was done.  We will check a lipid panel since she says she has been on the atorvastatin for several months.  If her LDL is not to goal we will need to change medicines or add Zetia.    Vitamin D deficiency: Her vitamin D was 17 when checked in March 2018.  It appears she is on a low-dose of over-the-counter vitamin D.  Usually when the vitamin D level is less than 20 patients will not replete their vitamin D unless they take prescription vitamin D of 50,000 a week for 6-12 weeks followed by maintenance of approximately 9338-6480 units of vitamin D3.  I will leave it up to her primary care, MAHESH Sun,  to treat her vitamin D deficiency.    She will follow up in 1 year with cardiology.  She will follow with primary care for other problems.  She follow-up sooner if problems.    Collaborating Provider: Dr. Corado.         No Recipients

## 2018-12-18 NOTE — PROGRESS NOTES
Chief Complaint   Patient presents with   • Edema       Subjective:   Katelynn Hart is a 63 y.o. Croatian-speaking female who presents today with her daughter, Cat who helps with translation.    The patient is here to follow-up on coronary artery disease.  She was last seen in May 2017 by myself.    According to her primary care provider's note, atorvastatin was stopped due to her lipids being in the normal range.  Last lipid panel that I have was done in March 2018 with an LDL of 189.  It is unclear whether the patient was on the atorvastatin at this time.  It appears that she may have been off of the medication.  However it has been restarted and the patient tells me she is taking atorvastatin 80 mg.    She has a rare sharp stabbing pains to her chest that lasts only seconds.  She complains of pain in her left foot for a year.  Otherwise she feels generally well.  She walks for exercise and tolerates it without any chest symptoms.    Past Medical History:   Diagnosis Date   • CAD (coronary artery disease) 06/2016    MIKI X 2 to mid and distal LAD and balloon to diagonal in 12/16   • Hyperlipidemia    • Kidney stones      Past Surgical History:   Procedure Laterality Date   • CARDIAC CATH  6/5/16     2 MIKI to LAD   • ANGIOPLASTY     • LITHOTRIPSY       Family History   Problem Relation Age of Onset   • Cancer Mother         bone cancer   • Hypertension Father    • Diabetes Father    • Hypertension Sister    • Hypertension Maternal Aunt    • Cancer Maternal Aunt    • Cancer Maternal Uncle    • Hypertension Maternal Uncle      Social History     Social History   • Marital status:      Spouse name: N/A   • Number of children: N/A   • Years of education: N/A     Occupational History   • Not on file.     Social History Main Topics   • Smoking status: Never Smoker   • Smokeless tobacco: Never Used   • Alcohol use No   • Drug use: No   • Sexual activity: Yes     Partners: Male     Other Topics Concern   •  Not on file     Social History Narrative   • No narrative on file     No Known Allergies  Outpatient Encounter Prescriptions as of 12/17/2018   Medication Sig Dispense Refill   • metoprolol SR (TOPROL XL) 25 MG TABLET SR 24 HR Take 1 Tab by mouth every day. 90 Tab 3   • [DISCONTINUED] metoprolol SR (TOPROL XL) 25 MG TABLET SR 24 HR Take 1 Tab by mouth every day. Needs to be seen for further refills. Thank you 30 Tab 0   • atorvastatin (LIPITOR) 80 MG tablet Take 1 Tab by mouth every day. 90 Tab 3   • Cholecalciferol (VITAMIND D3) 1000 UNIT Tab Take 1 Tab by mouth every day. 90 Tab 3   • calcium carbonate (OS-CARLOS 500) 1250 (500 Ca) MG Tab Take 2 Tabs by mouth every day. 90 Tab 3   • aspirin EC (ECOTRIN) 81 MG Tablet Delayed Response Take 1 Tab by mouth every day. 90 Tab 3   • nitroglycerin (NITROSTAT) 0.4 MG SL Tab Place 1 Tab under tongue as needed for Chest Pain. 25 Tab 11   • Omega-3 Fatty Acids (OMEGA 3 PO) Take 1 Cap by mouth every day.     • multivitamin (THERAGRAN) Tab Take 1 Tab by mouth every day.       No facility-administered encounter medications on file as of 12/17/2018.      Review of Systems   Constitutional: Negative for malaise/fatigue.   Respiratory: Negative for cough and shortness of breath.    Cardiovascular: Negative for chest pain, palpitations, orthopnea, claudication, leg swelling and PND.   Gastrointestinal: Negative for abdominal pain.   Musculoskeletal: Positive for joint pain (pain left foot.). Negative for myalgias.   Neurological: Negative for dizziness and weakness.        Objective:   /88 (BP Location: Left arm, Patient Position: Sitting, BP Cuff Size: Adult)   Pulse 62   Ht 1.524 m (5')   Wt 62.1 kg (137 lb)   SpO2 97%   BMI 26.76 kg/m²     Physical Exam   Constitutional: She appears well-developed and well-nourished.   HENT:   Head: Normocephalic.   Eyes: EOM are normal.   Neck: No JVD present.   Cardiovascular: Normal rate, regular rhythm and normal heart sounds.     Pulmonary/Chest: Effort normal and breath sounds normal.   Abdominal: Soft. Bowel sounds are normal.   Musculoskeletal: She exhibits no edema.   Neurological: She is alert.   Skin: Skin is warm and dry.   Psychiatric: She has a normal mood and affect.     Results for ZEKE ROSS (MRN [id number])    Ref. Range 3/24/2018 09:52   WBC Latest Ref Range: 4.8 - 10.8 K/uL 6.4   RBC Latest Ref Range: 4.20 - 5.40 M/uL 5.24   Hemoglobin Latest Ref Range: 12.0 - 16.0 g/dL 15.0   Hematocrit Latest Ref Range: 37.0 - 47.0 % 46.3   MCV Latest Ref Range: 81.4 - 97.8 fL 88.4   MCH Latest Ref Range: 27.0 - 33.0 pg 28.6   MCHC Latest Ref Range: 33.6 - 35.0 g/dL 32.4 (L)   RDW Latest Ref Range: 35.9 - 50.0 fL 40.8   Platelet Count Latest Ref Range: 164 - 446 K/uL 318   MPV Latest Ref Range: 9.0 - 12.9 fL 10.9   Neutrophils-Polys Latest Ref Range: 44.00 - 72.00 % 52.90   Neutrophils (Absolute) Latest Ref Range: 2.00 - 7.15 K/uL 3.41   Lymphocytes Latest Ref Range: 22.00 - 41.00 % 41.30 (H)   Lymphs (Absolute) Latest Ref Range: 1.00 - 4.80 K/uL 2.66   Monocytes Latest Ref Range: 0.00 - 13.40 % 5.00   Monos (Absolute) Latest Ref Range: 0.00 - 0.85 K/uL 0.32   Eosinophils Latest Ref Range: 0.00 - 6.90 % 0.00   Eos (Absolute) Latest Ref Range: 0.00 - 0.51 K/uL 0.00   Basophils Latest Ref Range: 0.00 - 1.80 % 0.60   Baso (Absolute) Latest Ref Range: 0.00 - 0.12 K/uL 0.04   Immature Granulocytes Latest Ref Range: 0.00 - 0.90 % 0.20   Immature Granulocytes (abs) Latest Ref Range: 0.00 - 0.11 K/uL 0.01   Nucleated RBC Latest Units: /100 WBC 0.00   NRBC (Absolute) Latest Units: K/uL 0.00   Sodium Latest Ref Range: 135 - 145 mmol/L 140   Potassium Latest Ref Range: 3.6 - 5.5 mmol/L 4.1   Chloride Latest Ref Range: 96 - 112 mmol/L 105   Co2 Latest Ref Range: 20 - 33 mmol/L 26   Anion Gap Latest Ref Range: 0.0 - 11.9  9.0   Glucose Latest Ref Range: 65 - 99 mg/dL 102 (H)   Bun Latest Ref Range: 8 - 22 mg/dL 21   Creatinine Latest Ref  Range: 0.50 - 1.40 mg/dL 0.59   GFR If  Latest Ref Range: >60 mL/min/1.73 m 2 >60   GFR If Non  Latest Ref Range: >60 mL/min/1.73 m 2 >60   Calcium Latest Ref Range: 8.5 - 10.5 mg/dL 10.1   AST(SGOT) Latest Ref Range: 12 - 45 U/L 19   ALT(SGPT) Latest Ref Range: 2 - 50 U/L 16   Alkaline Phosphatase Latest Ref Range: 30 - 99 U/L 111 (H)   Total Bilirubin Latest Ref Range: 0.1 - 1.5 mg/dL 0.7   Albumin Latest Ref Range: 3.2 - 4.9 g/dL 4.7   Total Protein Latest Ref Range: 6.0 - 8.2 g/dL 7.9   Globulin Latest Ref Range: 1.9 - 3.5 g/dL 3.2   A-G Ratio Latest Units: g/dL 1.5   Glycohemoglobin Latest Ref Range: 0.0 - 5.6 % 5.7 (H)   Estim. Avg Glu Latest Units: mg/dL 117   Cholesterol,Tot Latest Ref Range: 100 - 199 mg/dL 273 (H)   Triglycerides Latest Ref Range: 0 - 149 mg/dL 157 (H)   HDL Latest Ref Range: >=40 mg/dL 53   LDL Latest Ref Range: <100 mg/dL 189 (H)   Micro Alb Creat Ratio Latest Ref Range: 0 - 30 mg/g 12   Creatinine, Urine Latest Units: mg/dL 125.60   Microalbumin, Urine Random Latest Units: mg/dL 1.5   25-Hydroxy   Vitamin D 25 Latest Ref Range: 30 - 100 ng/mL 17 (L)   TSH Latest Ref Range: 0.380 - 5.330 uIU/mL 1.870     Assessment:     1. Coronary artery disease: MIKI to LAD ×2 and 6/2016     2. Essential hypertension  metoprolol SR (TOPROL XL) 25 MG TABLET SR 24 HR   3. Dyslipidemia  LIPID PANEL   4. Vitamin D deficiency         Medical Decision Making:  Today's Assessment / Status / Plan:     Coronary artery disease: Status post stenting in the past.  She has no anginal symptoms.  She is encouraged to continue with her walking for exercise.    Hypertension: Her blood pressure is elevated in the office today.  She ran out of metoprolol several months ago and has not been taking it.  We will have her restart the metoprolol 25 mg daily and have her follow-up with primary care for a blood pressure check in approximately 4-6 weeks.     Dyslipidemia: Her last lab work which  was done in March of this year was very elevated.  Since she has coronary artery disease her LDL goal is 70 or less it appears she may have been off the atorvastatin at the time the lab was done.  We will check a lipid panel since she says she has been on the atorvastatin for several months.  If her LDL is not to goal we will need to change medicines or add Zetia.    Vitamin D deficiency: Her vitamin D was 17 when checked in March 2018.  It appears she is on a low-dose of over-the-counter vitamin D.  Usually when the vitamin D level is less than 20 patients will not replete their vitamin D unless they take prescription vitamin D of 50,000 a week for 6-12 weeks followed by maintenance of approximately 5070-2145 units of vitamin D3.  I will leave it up to her primary care, MAHESH Sun,  to treat her vitamin D deficiency.    She will follow up in 1 year with cardiology.  She will follow with primary care for other problems.  She follow-up sooner if problems.    Collaborating Provider: Dr. Corado.

## 2019-01-21 ENCOUNTER — HOSPITAL ENCOUNTER (OUTPATIENT)
Dept: LAB | Facility: MEDICAL CENTER | Age: 64
End: 2019-01-21
Attending: NURSE PRACTITIONER
Payer: COMMERCIAL

## 2019-01-21 LAB
CHOLEST SERPL-MCNC: 169 MG/DL (ref 100–199)
FASTING STATUS PATIENT QL REPORTED: NORMAL
HDLC SERPL-MCNC: 39 MG/DL
LDLC SERPL CALC-MCNC: 80 MG/DL
TRIGL SERPL-MCNC: 248 MG/DL (ref 0–149)

## 2019-01-21 PROCEDURE — 36415 COLL VENOUS BLD VENIPUNCTURE: CPT

## 2019-01-21 PROCEDURE — 80061 LIPID PANEL: CPT

## 2019-01-22 ENCOUNTER — TELEPHONE (OUTPATIENT)
Dept: CARDIOLOGY | Facility: MEDICAL CENTER | Age: 64
End: 2019-01-22

## 2019-01-22 DIAGNOSIS — I10 ESSENTIAL HYPERTENSION: ICD-10-CM

## 2019-01-22 DIAGNOSIS — E78.5 DYSLIPIDEMIA: ICD-10-CM

## 2019-01-22 NOTE — TELEPHONE ENCOUNTER
Lipid Profile   Order: [id number]   Status:  Final result   Visible to patient:  No (Not Released)   Notes recorded by SALUD Bobby on 1/21/2019 at 5:23 PM PST  Please contact patient's daughter and give her the results of the lipids.  The total cholesterol and LDL are excellent.  Patient's triglycerides are high which is usually due to too many calories and/or sweets.  Continue on the atorvastatin since it is working very well.  Dietary changes and add exercise.  Repeat lab 1 week prior to follow-up appointment in December 2019.     Attempted to call daughter Taylor, unable to reach.  Left voicemail to return this call at her earliest convenience.    Lab ordered and printed.    Letter printed with APN's recommendations.  Letter and lab orders sent to patient's mailing address.

## 2019-01-22 NOTE — LETTER
January 22, 2019        Katelynn Hart  [address]   [place] [address]          Dear Taylor and Katelynn,    We have received the results of Katelynn's recent: Lipid Panel.    Her test came back and her total cholesterol and LDL are excellent.  Katelynn's triglycerides are high which is usually due to too many calories or sweets.  Letciia is recommending dietary changes and add exercise to bring the triglycerides down.  Continue on the Atorvastatin since it is working very well.  Katelynn will repeat blood work 1 week prior to her follow up visit in December 2019.    Feel free to call us with any questions.        Sincerely,          MAHESH Zapata R.N.  Electronically Signed

## 2019-01-22 NOTE — TELEPHONE ENCOUNTER
returning your call   Received: Today Message Contents   Evi Orourke R.N.   Phone Number: [phone]          CHARANJIT/leroy Rubalcava's daughter Dali returning your call, please call her back on work #[phone] or after 3:30 on cell# [phone].      Returned daughter, Taylor's, phone call and reviewed APN's recommendations.  She states no other concerns or questions at this time.  She verbalizes understanding and is appreciative of information.

## 2019-09-30 ENCOUNTER — TELEPHONE (OUTPATIENT)
Dept: CARDIOLOGY | Facility: MEDICAL CENTER | Age: 64
End: 2019-09-30

## 2019-09-30 NOTE — TELEPHONE ENCOUNTER
Try to get of hold of patient reminder to have labs done prior to her apt.Voicemail had a different jama Name I did not left message.

## 2019-10-15 ENCOUNTER — DOCUMENTATION (OUTPATIENT)
Dept: CARDIOLOGY | Facility: MEDICAL CENTER | Age: 64
End: 2019-10-15

## 2019-10-15 NOTE — PROGRESS NOTES
service needed at check-In to assist Melissa/oSl.    Wallisian speaking Patient    Patient believed she had appointment today. Unfortunately one was not scheduled.  Due to her insurance at the moment(Access to health care) a appointment could not be scheduled as a new REFERRAL would be needed.    Patient was informed that she would need her PCP to fax us a new Referral for a appointment to be made.    Sol gave her one of [employer] business cards and highlighted phone number to our office.    I explained to patient that when she calls to schedule there would be someone available vis phone to assist her w/interpreting.    Patient verbalizes understanding and states she will contact her PCP when she leaves her. And expresses her thanks for my assistance.

## 2020-07-20 ENCOUNTER — APPOINTMENT (OUTPATIENT)
Dept: RADIOLOGY | Facility: MEDICAL CENTER | Age: 65
End: 2020-07-20
Attending: EMERGENCY MEDICINE
Payer: COMMERCIAL

## 2020-07-20 ENCOUNTER — HOSPITAL ENCOUNTER (EMERGENCY)
Facility: MEDICAL CENTER | Age: 65
End: 2020-07-20
Attending: EMERGENCY MEDICINE
Payer: COMMERCIAL

## 2020-07-20 VITALS
HEIGHT: 60 IN | HEART RATE: 70 BPM | SYSTOLIC BLOOD PRESSURE: 139 MMHG | DIASTOLIC BLOOD PRESSURE: 76 MMHG | OXYGEN SATURATION: 95 % | TEMPERATURE: 98.2 F | RESPIRATION RATE: 18 BRPM | WEIGHT: 146 LBS | BODY MASS INDEX: 28.66 KG/M2

## 2020-07-20 DIAGNOSIS — R74.8 ELEVATED ALKALINE PHOSPHATASE LEVEL: ICD-10-CM

## 2020-07-20 DIAGNOSIS — R10.13 EPIGASTRIC PAIN: Primary | ICD-10-CM

## 2020-07-20 LAB
ALBUMIN SERPL BCP-MCNC: 4.2 G/DL (ref 3.2–4.9)
ALBUMIN/GLOB SERPL: 1.3 G/DL
ALP SERPL-CCNC: 169 U/L (ref 30–99)
ALT SERPL-CCNC: 24 U/L (ref 2–50)
ANION GAP SERPL CALC-SCNC: 16 MMOL/L (ref 7–16)
AST SERPL-CCNC: 40 U/L (ref 12–45)
BASOPHILS # BLD AUTO: 0.4 % (ref 0–1.8)
BASOPHILS # BLD: 0.05 K/UL (ref 0–0.12)
BILIRUB SERPL-MCNC: 0.5 MG/DL (ref 0.1–1.5)
BUN SERPL-MCNC: 12 MG/DL (ref 8–22)
CALCIUM SERPL-MCNC: 9.1 MG/DL (ref 8.5–10.5)
CHLORIDE SERPL-SCNC: 103 MMOL/L (ref 96–112)
CO2 SERPL-SCNC: 20 MMOL/L (ref 20–33)
CREAT SERPL-MCNC: 0.56 MG/DL (ref 0.5–1.4)
EKG IMPRESSION: NORMAL
EOSINOPHIL # BLD AUTO: 0 K/UL (ref 0–0.51)
EOSINOPHIL NFR BLD: 0 % (ref 0–6.9)
ERYTHROCYTE [DISTWIDTH] IN BLOOD BY AUTOMATED COUNT: 40.6 FL (ref 35.9–50)
GLOBULIN SER CALC-MCNC: 3.2 G/DL (ref 1.9–3.5)
GLUCOSE SERPL-MCNC: 127 MG/DL (ref 65–99)
HCT VFR BLD AUTO: 41.6 % (ref 37–47)
HGB BLD-MCNC: 14.1 G/DL (ref 12–16)
IMM GRANULOCYTES # BLD AUTO: 0.07 K/UL (ref 0–0.11)
IMM GRANULOCYTES NFR BLD AUTO: 0.5 % (ref 0–0.9)
LIPASE SERPL-CCNC: 57 U/L (ref 11–82)
LYMPHOCYTES # BLD AUTO: 1.31 K/UL (ref 1–4.8)
LYMPHOCYTES NFR BLD: 10.2 % (ref 22–41)
MCH RBC QN AUTO: 29.7 PG (ref 27–33)
MCHC RBC AUTO-ENTMCNC: 33.9 G/DL (ref 33.6–35)
MCV RBC AUTO: 87.8 FL (ref 81.4–97.8)
MONOCYTES # BLD AUTO: 0.57 K/UL (ref 0–0.85)
MONOCYTES NFR BLD AUTO: 4.4 % (ref 0–13.4)
NEUTROPHILS # BLD AUTO: 10.84 K/UL (ref 2–7.15)
NEUTROPHILS NFR BLD: 84.5 % (ref 44–72)
NRBC # BLD AUTO: 0 K/UL
NRBC BLD-RTO: 0 /100 WBC
PLATELET # BLD AUTO: 286 K/UL (ref 164–446)
PMV BLD AUTO: 10.5 FL (ref 9–12.9)
POTASSIUM SERPL-SCNC: 4 MMOL/L (ref 3.6–5.5)
PROT SERPL-MCNC: 7.4 G/DL (ref 6–8.2)
RBC # BLD AUTO: 4.74 M/UL (ref 4.2–5.4)
SODIUM SERPL-SCNC: 139 MMOL/L (ref 135–145)
TROPONIN T SERPL-MCNC: <6 NG/L (ref 6–19)
TROPONIN T SERPL-MCNC: <6 NG/L (ref 6–19)
WBC # BLD AUTO: 12.8 K/UL (ref 4.8–10.8)

## 2020-07-20 PROCEDURE — 93005 ELECTROCARDIOGRAM TRACING: CPT | Performed by: EMERGENCY MEDICINE

## 2020-07-20 PROCEDURE — 80053 COMPREHEN METABOLIC PANEL: CPT

## 2020-07-20 PROCEDURE — 84484 ASSAY OF TROPONIN QUANT: CPT | Mod: 91

## 2020-07-20 PROCEDURE — 71046 X-RAY EXAM CHEST 2 VIEWS: CPT

## 2020-07-20 PROCEDURE — 83690 ASSAY OF LIPASE: CPT

## 2020-07-20 PROCEDURE — 85025 COMPLETE CBC W/AUTO DIFF WBC: CPT

## 2020-07-20 PROCEDURE — 99284 EMERGENCY DEPT VISIT MOD MDM: CPT

## 2020-07-20 ASSESSMENT — ENCOUNTER SYMPTOMS
SHORTNESS OF BREATH: 1
NAUSEA: 1
ABDOMINAL PAIN: 0
PND: 0
BACK PAIN: 0
CHILLS: 0
HEADACHES: 0
PALPITATIONS: 0
COUGH: 0
NECK PAIN: 0
DIAPHORESIS: 1
LOSS OF CONSCIOUSNESS: 0
WHEEZING: 0
DIARRHEA: 0
FEVER: 0
VOMITING: 0
SORE THROAT: 0

## 2020-07-20 ASSESSMENT — HEART SCORE
AGE: 45-64
RISK FACTORS: >2 RISK FACTORS OR HX OF ATHEROSCLEROTIC DISEASE
ECG: NORMAL
HISTORY: MODERATELY SUSPICIOUS
HEART SCORE: 4
TROPONIN: LESS THAN OR EQUAL TO NORMAL LIMIT

## 2020-07-20 ASSESSMENT — LIFESTYLE VARIABLES: SUBSTANCE_ABUSE: 0

## 2020-07-20 NOTE — ED TRIAGE NOTES
Katelynn Ibanez De Shaffer  64 y.o.  Chief Complaint   Patient presents with   • Epigastric Pain     Per EMS pt experienced epigastric pain and bloating after eating; denies N/V/D, CP, SOB

## 2020-07-20 NOTE — ED PROVIDER NOTES
ED Provider Note     7/20/2020  4:34 PM    Means of Arrival: EMS  History obtained by: patient  Limitations: Yoruba speaking, daughter would like to interpret.  offered.   PCP: Rebekah Lozada  CODE STATUS: none    CHIEF COMPLAINT  Chief Complaint   Patient presents with   • Epigastric Pain     Per EMS pt experienced epigastric pain and bloating after eating; denies N/V/D, CP, SOB        HPI  Katelynn Hart is a 64 y.o. female with HLD, CAD, nonsmoker who presents with concerns of acute onset pain at epigastric region. She was eating chilaquiles just prior to arrival when she suddenly had pain at epigastric region. She says it felt like her stomach was about to burst. No radiating symptoms. She felt slightly short of breath, sweaty and nausea. Denies chest pain. Sensation not like previous MI (has 2 stents placed previously). EMS was called. She was given morphine and her pain immediately resolved. No leg swelling. No new medications. No hormone use. No recent surgeries or known cancer.     REVIEW OF SYSTEMS  Review of Systems   Constitutional: Positive for diaphoresis. Negative for chills and fever.   HENT: Negative for congestion and sore throat.    Respiratory: Positive for shortness of breath. Negative for cough and wheezing.    Cardiovascular: Negative for chest pain, palpitations, leg swelling and PND.   Gastrointestinal: Positive for nausea. Negative for abdominal pain, diarrhea and vomiting.   Genitourinary: Negative for dysuria.   Musculoskeletal: Negative for back pain and neck pain.   Skin: Negative for rash.   Neurological: Negative for loss of consciousness and headaches.   Psychiatric/Behavioral: Negative for substance abuse.   All other systems reviewed and are negative.    See HPI for further details.    PAST MEDICAL HISTORY   has a past medical history of CAD (coronary artery disease) (06/2016), Hyperlipidemia, and Kidney stones.    SOCIAL HISTORY  Social History     Tobacco Use    • Smoking status: Never Smoker   • Smokeless tobacco: Never Used   Substance and Sexual Activity   • Alcohol use: No   • Drug use: No   • Sexual activity: Yes     Partners: Male       SURGICAL HISTORY   has a past surgical history that includes lithotripsy; angioplasty; and zzz cardiac cath (6/5/16).    CURRENT MEDICATIONS  Home Medications    **Home medications have not yet been reviewed for this encounter**         ALLERGIES  No Known Allergies    PHYSICAL EXAM  VITAL SIGNS: /76   Pulse 70   Temp 36.8 °C (98.2 °F) (Temporal)   Resp 18   Ht 1.524 m (5')   Wt 66.2 kg (146 lb)   SpO2 95%   BMI 28.51 kg/m²     Pulse ox interpretation: I interpret this pulse ox as normal.  Constitutional: Alert in no apparent distress. Very pleasant 64 year old woman.   HENT: No signs of trauma, Bilateral external ears normal, Nose normal.   Eyes: Pupils are equal, Conjunctiva normal, Non-icteric.   Neck: Normal range of motion, No tenderness, Supple, No stridor.   Cardiovascular: Regular rate and rhythm, no murmurs. Symmetric distal pulses. No cyanosis of extremities. No peripheral edema of extremities.  Thorax & Lungs: Normal breath sounds, No respiratory distress, No wheezing, No chest tenderness.   Abdomen: Soft, No tenderness, No masses, No pulsatile masses. No peritoneal signs.  Skin: Warm, Dry, No erythema, No rash.   Musculoskeletal: Good range of motion in all major joints. No tenderness to palpation or major deformities noted.   Neurologic: Alert , Normal motor function, Normal sensory function, No focal deficits noted.   Psychiatric: Affect normal, Judgment normal, Mood normal.   Physical Exam      DIAGNOSTIC STUDIES / PROCEDURES    EKG  Results for orders placed or performed during the hospital encounter of 07/20/20   EKG   Result Value Ref Range    Report       [facility] Emergency Dept.    Test Date:  2020-07-20  Pt Name:    ZEKE ROSS               Department: ER  MRN:         [id number]                      Room:       [facility]  Gender:     Female                       Technician: [id number]  :        [id number]                   Requested By:CALLIE LOPEZ II  Order #:    [id number]                    Reading MD:    Measurements  Intervals                                Axis  Rate:       72                           P:          39  IN:         168                          QRS:        -13  QRSD:       82                           T:          -4  QT:         356  QTc:        390    Interpretive Statements  SINUS RHYTHM  BORDERLINE T ABNORMALITIES, INFERIOR LEADS  BASELINE WANDER IN LEAD(S) V1  Compared to ECG 2016 08:08:28  T-wave abnormality now present  Sinus bradycardia no longer present           LABS  Pertinent Labs & Imaging studies reviewed. (See chart for details)    RADIOLOGY  Pertinent Labs & Imaging studies reviewed. (See chart for details)    COURSE & MEDICAL DECISION MAKING  Pertinent Labs & Imaging studies reviewed. (See chart for details)    4:34 PM This is an emergent evaluation of a  64 y.o. female with coronary artery disease who presents with concerns of acute onset epigastric pain while eating.  Currently pain-free after receiving pain medication by EMS.  Description of symptoms are worrisome for possible ACS but could also be consistent with biliary disease, gastritis, indigestion, pancreatitis.    7:25 PM  Labs with slight elevation in alk phos but no other significant abnormalities.  This could be sign of possible passage of gallstone.  Because she no longer has pain, no tenderness of the right upper quadrant I did not pursue further imaging for gallstones.  I discussed this with her and her daughter that she may have gallstones and if she has similar episode after eating a fatty meal that would be consistent with a problem.  I encouraged her to follow-up with primary provider if she continued to have episodes like this after eating.  Troponin was negative x2.   HEART score is 4 moderate risk given age, risk factors.  Even though she has moderate risk of ACS I felt that it was acceptable to discharge because of complete resolution of symptoms, she never actually had chest pain and I was screening for ACS based on her risk factors alone.  They are agreeable to follow-up with her primary provider as specifically would like elevated alk phos level to be followed because this could also be an early indicator of malignancy as well as biliary disease.    The patient will return for worsening symptoms and is stable at the time of discharge. The patient verbalizes understanding. Guidance was provided on appropriate use of medications including driving under the influence, overdose, and side effects.         FINAL IMPRESSION    ICD-10-CM   1. Epigastric pain  R10.13   2. Elevated alkaline phosphatase level  R74.8            This dictation was created using voice recognition software. The accuracy of the dictation is limited to the abilities of the software. I expect there may be some errors of grammar and possibly content. The nursing notes were reviewed and certain aspects of this information were incorporated into this note.    Electronically signed by: Zheng Rosado II, M.D., 7/20/2020 4:34 PM

## 2020-07-21 NOTE — ED NOTES
Patient discharged in stable condition per orders. IV access removed - bandage applied. Patient verbalized understanding of all discharge instructions. All belongings accounted for.

## 2020-07-21 NOTE — DISCHARGE INSTRUCTIONS
Procedure  Component  Value  Ref Range  Date/Time    TROPONIN [[id number]]  Collected: 07/20/20 1800    Lab Status: Final result  Specimen: Blood  Updated: 07/20/20 1832     Troponin T  <6  6 - 19 ng/L      Comment:  As of July 9th 2019 at 0900, the Troponin I test has been replaced with the   Ultra High Sensitivity (Gen 5) Troponin T test.  Please note new analyte,   methodology, and reference range.   The Ultra High Sensitivity Troponin T test has a reference range for   positive troponins that follows the recommendation of the American College   of Cardiology (ACC) committee in conjunction with the 99th percentile   reference population. Normal range: 6-19 ng/L       Narrative:      Collect at 18:00    ESTIMATED GFR [[id number]]  Collected: 07/20/20 1700    Lab Status: Final result  Updated: 07/20/20 1732     GFR If African American  >60  >60 mL/min/1.73 m 2      GFR If Non African American  >60  >60 mL/min/1.73 m 2     TROPONIN [[id number]]  Collected: 07/20/20 1700    Lab Status: Final result  Specimen: Blood  Updated: 07/20/20 1732     Troponin T  <6  6 - 19 ng/L      Comment:  As of July 9th 2019 at 0900, the Troponin I test has been replaced with the   Ultra High Sensitivity (Gen 5) Troponin T test.  Please note new analyte,   methodology, and reference range.   The Ultra High Sensitivity Troponin T test has a reference range for   positive troponins that follows the recommendation of the American College   of Cardiology (ACC) committee in conjunction with the 99th percentile   reference population. Normal range: 6-19 ng/L       COMP METABOLIC PANEL [[id number]] (Abnormal)  Collected: 07/20/20 1700    Lab Status: Final result  Specimen: Blood  Updated: 07/20/20 1732     Sodium  139  135 - 145 mmol/L      Potassium  4.0  3.6 - 5.5 mmol/L      Chloride  103  96 - 112 mmol/L      Co2  20  20 - 33 mmol/L      Anion Gap  16.0  7.0 - 16.0      Glucose  127High    65 - 99 mg/dL      Bun  12  8 - 22 mg/dL       Creatinine  0.56  0.50 - 1.40 mg/dL      Calcium  9.1  8.5 - 10.5 mg/dL      AST(SGOT)  40  12 - 45 U/L      ALT(SGPT)  24  2 - 50 U/L      Alkaline Phosphatase  169High    30 - 99 U/L      Total Bilirubin  0.5  0.1 - 1.5 mg/dL      Albumin  4.2  3.2 - 4.9 g/dL      Total Protein  7.4  6.0 - 8.2 g/dL      Globulin  3.2  1.9 - 3.5 g/dL      A-G Ratio  1.3  g/dL     LIPASE [[id number]]  Collected: 07/20/20 1700    Lab Status: Final result  Specimen: Blood  Updated: 07/20/20 1732     Lipase  57  11 - 82 U/L     CBC WITH DIFFERENTIAL [[id number]] (Abnormal)  Collected: 07/20/20 1700    Lab Status: Final result  Specimen: Blood  Updated: 07/20/20 1723     WBC  12.8High    4.8 - 10.8 K/uL      RBC  4.74  4.20 - 5.40 M/uL      Hemoglobin  14.1  12.0 - 16.0 g/dL      Hematocrit  41.6  37.0 - 47.0 %      MCV  87.8  81.4 - 97.8 fL      MCH  29.7  27.0 - 33.0 pg      MCHC  33.9  33.6 - 35.0 g/dL      RDW  40.6  35.9 - 50.0 fL      Platelet Count  286  164 - 446 K/uL      MPV  10.5  9.0 - 12.9 fL      Neutrophils-Polys  84.50High    44.00 - 72.00 %      Lymphocytes  10.20Low    22.00 - 41.00 %      Monocytes  4.40  0.00 - 13.40 %      Eosinophils  0.00  0.00 - 6.90 %      Basophils  0.40  0.00 - 1.80 %      Immature Granulocytes  0.50  0.00 - 0.90 %      Nucleated RBC  0.00  /100 WBC      Neutrophils (Absolute)  10.84High    2.00 - 7.15 K/uL      Comment:  Includes immature neutrophils, if present.        Lymphs (Absolute)  1.31  1.00 - 4.80 K/uL      Monos (Absolute)  0.57  0.00 - 0.85 K/uL      Eos (Absolute)  0.00  0.00 - 0.51 K/uL      Baso (Absolute)  0.05  0.00 - 0.12 K/uL      Immature Granulocytes (abs)  0.07  0.00 - 0.11 K/uL      NRBC (Absolute)  0.00  K/uL     Imaging Results          DX-CHEST-2 VIEWS (Final result)   Result time 07/20/20 17:32:51   Final result                 Impression:       No acute cardiopulmonary abnormality.             Narrative:       7/20/2020 4:57 PM     HISTORY/REASON FOR EXAM:  Epigastric  pain     TECHNIQUE/EXAM DESCRIPTION AND NUMBER OF VIEWS:   Two views of the chest.     COMPARISON:  6/3/2016.     FINDINGS:     LUNGS: Clear. No effusions.   PNEUMOTHORAX: None.   LINES AND TUBES: None.   MEDIASTINUM: No cardiomegaly.   MUSCULOSKELETAL STRUCTURES: No acute displaced fracture.

## 2020-09-17 ENCOUNTER — OFFICE VISIT (OUTPATIENT)
Dept: CARDIOLOGY | Facility: MEDICAL CENTER | Age: 65
End: 2020-09-17
Payer: COMMERCIAL

## 2020-09-17 VITALS
OXYGEN SATURATION: 96 % | WEIGHT: 144 LBS | HEART RATE: 88 BPM | SYSTOLIC BLOOD PRESSURE: 126 MMHG | DIASTOLIC BLOOD PRESSURE: 80 MMHG | BODY MASS INDEX: 28.12 KG/M2

## 2020-09-17 DIAGNOSIS — E78.5 DYSLIPIDEMIA: ICD-10-CM

## 2020-09-17 DIAGNOSIS — I25.119 CORONARY ARTERY DISEASE INVOLVING NATIVE CORONARY ARTERY OF NATIVE HEART WITH ANGINA PECTORIS (HCC): ICD-10-CM

## 2020-09-17 DIAGNOSIS — I10 HTN (HYPERTENSION), MALIGNANT: ICD-10-CM

## 2020-09-17 DIAGNOSIS — Z95.5 STENTED CORONARY ARTERY: ICD-10-CM

## 2020-09-17 DIAGNOSIS — Z79.899 HIGH RISK MEDICATION USE: ICD-10-CM

## 2020-09-17 PROCEDURE — 99215 OFFICE O/P EST HI 40 MIN: CPT | Performed by: INTERNAL MEDICINE

## 2020-09-17 RX ORDER — METOPROLOL SUCCINATE 25 MG/1
25 TABLET, EXTENDED RELEASE ORAL DAILY
Qty: 90 TAB | Refills: 3 | Status: ON HOLD
Start: 2020-09-17 | End: 2020-11-07

## 2020-09-17 RX ORDER — ATORVASTATIN CALCIUM 80 MG/1
80 TABLET, FILM COATED ORAL DAILY
Qty: 90 TAB | Refills: 3 | Status: SHIPPED | OUTPATIENT
Start: 2020-09-17

## 2020-09-17 ASSESSMENT — ENCOUNTER SYMPTOMS
ORTHOPNEA: 0
DOUBLE VISION: 0
MYALGIAS: 0
SENSORY CHANGE: 0
FEVER: 0
LOSS OF CONSCIOUSNESS: 0
PND: 0
COUGH: 0
DEPRESSION: 0
CHILLS: 0
DIZZINESS: 0
EYE PAIN: 0
SHORTNESS OF BREATH: 0
ABDOMINAL PAIN: 0
BRUISES/BLEEDS EASILY: 0
BLURRED VISION: 0
SPEECH CHANGE: 0
HEADACHES: 0
WEIGHT LOSS: 0
VOMITING: 0
HALLUCINATIONS: 0
CLAUDICATION: 0
PALPITATIONS: 0
BLOOD IN STOOL: 0
NAUSEA: 0
FALLS: 0
EYE DISCHARGE: 0

## 2020-09-17 ASSESSMENT — FIBROSIS 4 INDEX: FIB4 SCORE: 1.8

## 2020-09-17 NOTE — PROGRESS NOTES
Chief Complaint   Patient presents with   • MI (Non ST Segment Elevation MI)     follow up [facility] id # [id number]       Subjective:   Katelynn Hart is a 65 y.o. female who presents today for cardiac care and evaluation for chest pain. Patient has chest pain at sporadic times. No specific precipitating factors or worsening factors. Chest pain is described to be sharp sensation however localized at anterior chest without radition. Lasting for seconds not minutes. No prior cardiac problems. No prior cardiac workup.    Patient does have a history of established coronary to disease status post 2 stents placement in the mid and distal LAD in December 2016.    Her last ischemic work-up was done with myocardial PET scan stress test in 2016.    Past Medical History:   Diagnosis Date   • CAD (coronary artery disease) 06/2016    MIKI X 2 to mid and distal LAD and balloon to diagonal in 12/16   • Hyperlipidemia    • Kidney stones      Past Surgical History:   Procedure Laterality Date   • ZZZ CARDIAC CATH  6/5/16     2 MIKI to LAD   • ANGIOPLASTY     • LITHOTRIPSY       Family History   Problem Relation Age of Onset   • Cancer Mother         bone cancer   • Hypertension Father    • Diabetes Father    • Hypertension Sister    • Hypertension Maternal Aunt    • Cancer Maternal Aunt    • Cancer Maternal Uncle    • Hypertension Maternal Uncle      Social History     Socioeconomic History   • Marital status:      Spouse name: Not on file   • Number of children: Not on file   • Years of education: Not on file   • Highest education level: Not on file   Occupational History   • Not on file   Social Needs   • Financial resource strain: Not on file   • Food insecurity     Worry: Not on file     Inability: Not on file   • Transportation needs     Medical: Not on file     Non-medical: Not on file   Tobacco Use   • Smoking status: Never Smoker   • Smokeless tobacco: Never Used   Substance and Sexual Activity   •  Alcohol use: No   • Drug use: No   • Sexual activity: Yes     Partners: Male   Lifestyle   • Physical activity     Days per week: Not on file     Minutes per session: Not on file   • Stress: Not on file   Relationships   • Social connections     Talks on phone: Not on file     Gets together: Not on file     Attends Jew service: Not on file     Active member of club or organization: Not on file     Attends meetings of clubs or organizations: Not on file     Relationship status: Not on file   • Intimate partner violence     Fear of current or ex partner: Not on file     Emotionally abused: Not on file     Physically abused: Not on file     Forced sexual activity: Not on file   Other Topics Concern   • Not on file   Social History Narrative   • Not on file     No Known Allergies  Outpatient Encounter Medications as of 9/17/2020   Medication Sig Dispense Refill   • atorvastatin (LIPITOR) 80 MG tablet Take 1 Tab by mouth every day. 90 Tab 3   • metoprolol SR (TOPROL XL) 25 MG TABLET SR 24 HR Take 1 Tab by mouth every day. 90 Tab 3   • aspirin EC (ECOTRIN) 81 MG Tablet Delayed Response Take 1 Tab by mouth every day. 90 Tab 3   • nitroglycerin (NITROSTAT) 0.4 MG SL Tab Place 1 Tab under tongue as needed for Chest Pain. 25 Tab 11   • [DISCONTINUED] metoprolol SR (TOPROL XL) 25 MG TABLET SR 24 HR Take 1 Tab by mouth every day. 90 Tab 3   • Cholecalciferol (VITAMIND D3) 1000 UNIT Tab Take 1 Tab by mouth every day. (Patient not taking: Reported on 9/17/2020) 90 Tab 3   • calcium carbonate (OS-CARLOS 500) 1250 (500 Ca) MG Tab Take 2 Tabs by mouth every day. (Patient not taking: Reported on 9/17/2020) 90 Tab 3   • [DISCONTINUED] atorvastatin (LIPITOR) 80 MG tablet Take 1 Tab by mouth every day. 90 Tab 3   • Omega-3 Fatty Acids (OMEGA 3 PO) Take 1 Cap by mouth every day.     • multivitamin (THERAGRAN) Tab Take 1 Tab by mouth every day.       No facility-administered encounter medications on file as of 9/17/2020.      Review of  Systems   Constitutional: Negative for chills, fever, malaise/fatigue and weight loss.   HENT: Negative for ear discharge, ear pain, hearing loss and nosebleeds.    Eyes: Negative for blurred vision, double vision, pain and discharge.   Respiratory: Negative for cough and shortness of breath.    Cardiovascular: Positive for chest pain. Negative for palpitations, orthopnea, claudication, leg swelling and PND.   Gastrointestinal: Negative for abdominal pain, blood in stool, melena, nausea and vomiting.   Genitourinary: Negative for dysuria and hematuria.   Musculoskeletal: Negative for falls, joint pain and myalgias.   Skin: Negative for itching and rash.   Neurological: Negative for dizziness, sensory change, speech change, loss of consciousness and headaches.   Endo/Heme/Allergies: Negative for environmental allergies. Does not bruise/bleed easily.   Psychiatric/Behavioral: Negative for depression, hallucinations and suicidal ideas.        Objective:   /80 (BP Location: Left arm, Patient Position: Sitting)   Pulse 88   Wt 65.3 kg (144 lb)   SpO2 96%   BMI 28.12 kg/m²     Physical Exam   Constitutional: She is oriented to person, place, and time. No distress.   HENT:   Head: Normocephalic and atraumatic.   Right Ear: External ear normal.   Left Ear: External ear normal.   Eyes: Right eye exhibits no discharge. Left eye exhibits no discharge.   Neck: No JVD present. No thyromegaly present.   Cardiovascular: Normal rate, regular rhythm, normal heart sounds and intact distal pulses. Exam reveals no gallop and no friction rub.   No murmur heard.  Pulmonary/Chest: Breath sounds normal. No respiratory distress.   Abdominal: Bowel sounds are normal. She exhibits no distension. There is no abdominal tenderness.   Musculoskeletal:         General: No tenderness or edema.   Neurological: She is alert and oriented to person, place, and time. No cranial nerve deficit.   Skin: Skin is warm and dry. She is not  diaphoretic.   Psychiatric: She has a normal mood and affect. Her behavior is normal.   Nursing note and vitals reviewed.      Assessment:     1. Coronary artery disease: MIKI to LAD ×2 and 6/2016  NM-CARDIAC PET   2. Stented coronary artery  NM-CARDIAC PET   3. Dyslipidemia  atorvastatin (LIPITOR) 80 MG tablet   4. HTN (hypertension), malignant     5. High risk medication use     6. Coronary artery disease involving native coronary artery of native heart with angina pectoris (HCC)  atorvastatin (LIPITOR) 80 MG tablet       Medical Decision Making:  Today's Assessment / Status / Plan:   At this time, to further risk stratify, I will order a transthoracic echocardiogram to assess cardiac and valvular functions. I will also order a myocardial PET scan stress test to assess for coronary ischemia.

## 2020-09-28 ENCOUNTER — TELEPHONE (OUTPATIENT)
Dept: CARDIOLOGY | Facility: MEDICAL CENTER | Age: 65
End: 2020-09-28

## 2020-09-28 NOTE — TELEPHONE ENCOUNTER
LVM at [phone] notifying that no lab work was ordered at TT appt, but echo and cardiac PET can be scheduled by calling image schedulers. Provided with their number.

## 2020-09-28 NOTE — TELEPHONE ENCOUNTER
TT/ana    Pt's daughter Dali calling to ask if TT wants any blood work done, she saw TT on 9/17.  Please call to advise, [phone]

## 2020-10-27 ENCOUNTER — NURSE TRIAGE (OUTPATIENT)
Dept: HEALTH INFORMATION MANAGEMENT | Facility: OTHER | Age: 65
End: 2020-10-27

## 2020-10-27 NOTE — TELEPHONE ENCOUNTER
1. Caller Name:   Nell             Call Back Number:   [facility] Yes         2.  In the last two weeks, has the patient had any new or worsening symptoms (not explained by alternative diagnosis)? Yes, the patient reports the following COVID-19 consistent symptoms: fever of at least 100.4°F (38°C) or greater, congestion or runny nose and muscle pain or body aches, since last night.     3.  Does patient have any comoribidities? None Heart    4.  Has the patient traveled in the last 14 days OR had any known contact with someone who is suspected or confirmed to have COVID-19?  No.    5. Disposition: Advised to perform self care, monitor for worsening symptoms and to call back in 3 days if no improvement    Note routed to [facility] Provider: FYI only.

## 2020-10-28 ENCOUNTER — APPOINTMENT (OUTPATIENT)
Dept: RADIOLOGY | Facility: MEDICAL CENTER | Age: 65
End: 2020-10-28
Attending: INTERNAL MEDICINE
Payer: COMMERCIAL

## 2020-10-28 ENCOUNTER — APPOINTMENT (OUTPATIENT)
Dept: CARDIOLOGY | Facility: MEDICAL CENTER | Age: 65
End: 2020-10-28
Attending: INTERNAL MEDICINE
Payer: COMMERCIAL

## 2020-11-03 ENCOUNTER — APPOINTMENT (OUTPATIENT)
Dept: RADIOLOGY | Facility: MEDICAL CENTER | Age: 65
DRG: 177 | End: 2020-11-03
Attending: EMERGENCY MEDICINE
Payer: COMMERCIAL

## 2020-11-03 ENCOUNTER — APPOINTMENT (OUTPATIENT)
Dept: RADIOLOGY | Facility: MEDICAL CENTER | Age: 65
DRG: 177 | End: 2020-11-03
Payer: COMMERCIAL

## 2020-11-03 ENCOUNTER — HOSPITAL ENCOUNTER (OUTPATIENT)
Facility: MEDICAL CENTER | Age: 65
End: 2020-11-03
Attending: FAMILY MEDICINE
Payer: COMMERCIAL

## 2020-11-03 ENCOUNTER — OFFICE VISIT (OUTPATIENT)
Dept: URGENT CARE | Facility: PHYSICIAN GROUP | Age: 65
End: 2020-11-03
Payer: COMMERCIAL

## 2020-11-03 ENCOUNTER — HOSPITAL ENCOUNTER (OUTPATIENT)
Dept: RADIOLOGY | Facility: MEDICAL CENTER | Age: 65
End: 2020-11-03
Attending: FAMILY MEDICINE
Payer: COMMERCIAL

## 2020-11-03 ENCOUNTER — HOSPITAL ENCOUNTER (INPATIENT)
Facility: MEDICAL CENTER | Age: 65
LOS: 4 days | DRG: 177 | End: 2020-11-07
Attending: EMERGENCY MEDICINE | Admitting: HOSPITALIST
Payer: COMMERCIAL

## 2020-11-03 VITALS
BODY MASS INDEX: 31.07 KG/M2 | SYSTOLIC BLOOD PRESSURE: 104 MMHG | DIASTOLIC BLOOD PRESSURE: 60 MMHG | HEIGHT: 57 IN | TEMPERATURE: 100.8 F | HEART RATE: 87 BPM | WEIGHT: 144 LBS | OXYGEN SATURATION: 93 % | RESPIRATION RATE: 20 BRPM

## 2020-11-03 DIAGNOSIS — R05.9 COUGH: ICD-10-CM

## 2020-11-03 DIAGNOSIS — R50.9 FEVER, UNSPECIFIED FEVER CAUSE: ICD-10-CM

## 2020-11-03 DIAGNOSIS — R19.7 DIARRHEA, UNSPECIFIED TYPE: ICD-10-CM

## 2020-11-03 DIAGNOSIS — R09.02 HYPOXIA: ICD-10-CM

## 2020-11-03 DIAGNOSIS — U07.1 PNEUMONIA DUE TO COVID-19 VIRUS: ICD-10-CM

## 2020-11-03 DIAGNOSIS — J18.9 MULTIFOCAL PNEUMONIA: ICD-10-CM

## 2020-11-03 DIAGNOSIS — J12.82 PNEUMONIA DUE TO COVID-19 VIRUS: ICD-10-CM

## 2020-11-03 PROBLEM — E87.6 HYPOKALEMIA: Status: ACTIVE | Noted: 2020-11-03

## 2020-11-03 PROBLEM — E87.1 HYPONATREMIA: Status: ACTIVE | Noted: 2020-11-03

## 2020-11-03 PROBLEM — E66.9 OBESITY: Status: ACTIVE | Noted: 2020-11-03

## 2020-11-03 PROBLEM — J96.01 ACUTE HYPOXEMIC RESPIRATORY FAILURE DUE TO COVID-19 (HCC): Status: ACTIVE | Noted: 2020-11-03

## 2020-11-03 LAB
ALBUMIN SERPL BCP-MCNC: 3.6 G/DL (ref 3.2–4.9)
ALBUMIN/GLOB SERPL: 0.8 G/DL
ALP SERPL-CCNC: 98 U/L (ref 30–99)
ALT SERPL-CCNC: 19 U/L (ref 2–50)
ANION GAP SERPL CALC-SCNC: 11 MMOL/L (ref 7–16)
APPEARANCE UR: ABNORMAL
AST SERPL-CCNC: 33 U/L (ref 12–45)
BACTERIA #/AREA URNS HPF: NEGATIVE /HPF
BASOPHILS # BLD AUTO: 0.1 % (ref 0–1.8)
BASOPHILS # BLD: 0.01 K/UL (ref 0–0.12)
BILIRUB SERPL-MCNC: 0.5 MG/DL (ref 0.1–1.5)
BILIRUB UR QL STRIP.AUTO: NEGATIVE
BUN SERPL-MCNC: 10 MG/DL (ref 8–22)
CALCIUM SERPL-MCNC: 8.4 MG/DL (ref 8.5–10.5)
CHLORIDE SERPL-SCNC: 92 MMOL/L (ref 96–112)
CO2 SERPL-SCNC: 24 MMOL/L (ref 20–33)
COLOR UR: ABNORMAL
COVID ORDER STATUS COVID19: NORMAL
CREAT SERPL-MCNC: 0.58 MG/DL (ref 0.5–1.4)
CRP SERPL HS-MCNC: 8.38 MG/DL (ref 0–0.75)
D DIMER PPP IA.FEU-MCNC: 1.02 UG/ML (FEU) (ref 0–0.5)
EKG IMPRESSION: NORMAL
EOSINOPHIL # BLD AUTO: 0 K/UL (ref 0–0.51)
EOSINOPHIL NFR BLD: 0 % (ref 0–6.9)
EPI CELLS #/AREA URNS HPF: ABNORMAL /HPF
ERYTHROCYTE [DISTWIDTH] IN BLOOD BY AUTOMATED COUNT: 41.1 FL (ref 35.9–50)
GLOBULIN SER CALC-MCNC: 4.4 G/DL (ref 1.9–3.5)
GLUCOSE SERPL-MCNC: 110 MG/DL (ref 65–99)
GLUCOSE UR STRIP.AUTO-MCNC: NEGATIVE MG/DL
HCT VFR BLD AUTO: 39.6 % (ref 37–47)
HGB BLD-MCNC: 13.5 G/DL (ref 12–16)
HYALINE CASTS #/AREA URNS LPF: ABNORMAL /LPF
IMM GRANULOCYTES # BLD AUTO: 0.04 K/UL (ref 0–0.11)
IMM GRANULOCYTES NFR BLD AUTO: 0.5 % (ref 0–0.9)
KETONES UR STRIP.AUTO-MCNC: 40 MG/DL
LACTATE BLD-SCNC: 1.7 MMOL/L (ref 0.5–2)
LEUKOCYTE ESTERASE UR QL STRIP.AUTO: ABNORMAL
LYMPHOCYTES # BLD AUTO: 2.21 K/UL (ref 1–4.8)
LYMPHOCYTES NFR BLD: 26.7 % (ref 22–41)
MCH RBC QN AUTO: 28.7 PG (ref 27–33)
MCHC RBC AUTO-ENTMCNC: 34.1 G/DL (ref 33.6–35)
MCV RBC AUTO: 84.3 FL (ref 81.4–97.8)
MICRO URNS: ABNORMAL
MONOCYTES # BLD AUTO: 0.32 K/UL (ref 0–0.85)
MONOCYTES NFR BLD AUTO: 3.9 % (ref 0–13.4)
NEUTROPHILS # BLD AUTO: 5.69 K/UL (ref 2–7.15)
NEUTROPHILS NFR BLD: 68.8 % (ref 44–72)
NITRITE UR QL STRIP.AUTO: NEGATIVE
NRBC # BLD AUTO: 0 K/UL
NRBC BLD-RTO: 0 /100 WBC
PH UR STRIP.AUTO: 6 [PH] (ref 5–8)
PLATELET # BLD AUTO: 229 K/UL (ref 164–446)
PMV BLD AUTO: 10.3 FL (ref 9–12.9)
POTASSIUM SERPL-SCNC: 3.2 MMOL/L (ref 3.6–5.5)
PROCALCITONIN SERPL-MCNC: <0.05 NG/ML
PROT SERPL-MCNC: 8 G/DL (ref 6–8.2)
PROT UR QL STRIP: 300 MG/DL
RBC # BLD AUTO: 4.7 M/UL (ref 4.2–5.4)
RBC # URNS HPF: ABNORMAL /HPF
RBC UR QL AUTO: NEGATIVE
SARS-COV-2 RNA RESP QL NAA+PROBE: DETECTED
SODIUM SERPL-SCNC: 127 MMOL/L (ref 135–145)
SP GR UR STRIP.AUTO: 1.02
SPECIMEN SOURCE: ABNORMAL
TROPONIN T SERPL-MCNC: 8 NG/L (ref 6–19)
UROBILINOGEN UR STRIP.AUTO-MCNC: 1 MG/DL
WBC # BLD AUTO: 8.3 K/UL (ref 4.8–10.8)
WBC #/AREA URNS HPF: ABNORMAL /HPF

## 2020-11-03 PROCEDURE — 99285 EMERGENCY DEPT VISIT HI MDM: CPT

## 2020-11-03 PROCEDURE — 81001 URINALYSIS AUTO W/SCOPE: CPT

## 2020-11-03 PROCEDURE — 84484 ASSAY OF TROPONIN QUANT: CPT

## 2020-11-03 PROCEDURE — U0003 INFECTIOUS AGENT DETECTION BY NUCLEIC ACID (DNA OR RNA); SEVERE ACUTE RESPIRATORY SYNDROME CORONAVIRUS 2 (SARS-COV-2) (CORONAVIRUS DISEASE [COVID-19]), AMPLIFIED PROBE TECHNIQUE, MAKING USE OF HIGH THROUGHPUT TECHNOLOGIES AS DESCRIBED BY CMS-2020-01-R: HCPCS | Mod: 91

## 2020-11-03 PROCEDURE — 83520 IMMUNOASSAY QUANT NOS NONAB: CPT

## 2020-11-03 PROCEDURE — 36415 COLL VENOUS BLD VENIPUNCTURE: CPT

## 2020-11-03 PROCEDURE — 93005 ELECTROCARDIOGRAM TRACING: CPT

## 2020-11-03 PROCEDURE — A9270 NON-COVERED ITEM OR SERVICE: HCPCS | Performed by: HOSPITALIST

## 2020-11-03 PROCEDURE — 85379 FIBRIN DEGRADATION QUANT: CPT

## 2020-11-03 PROCEDURE — 84145 PROCALCITONIN (PCT): CPT

## 2020-11-03 PROCEDURE — A9270 NON-COVERED ITEM OR SERVICE: HCPCS | Performed by: EMERGENCY MEDICINE

## 2020-11-03 PROCEDURE — U0003 INFECTIOUS AGENT DETECTION BY NUCLEIC ACID (DNA OR RNA); SEVERE ACUTE RESPIRATORY SYNDROME CORONAVIRUS 2 (SARS-COV-2) (CORONAVIRUS DISEASE [COVID-19]), AMPLIFIED PROBE TECHNIQUE, MAKING USE OF HIGH THROUGHPUT TECHNOLOGIES AS DESCRIBED BY CMS-2020-01-R: HCPCS

## 2020-11-03 PROCEDURE — 87040 BLOOD CULTURE FOR BACTERIA: CPT

## 2020-11-03 PROCEDURE — 71046 X-RAY EXAM CHEST 2 VIEWS: CPT

## 2020-11-03 PROCEDURE — 83605 ASSAY OF LACTIC ACID: CPT

## 2020-11-03 PROCEDURE — 96375 TX/PRO/DX INJ NEW DRUG ADDON: CPT

## 2020-11-03 PROCEDURE — 700102 HCHG RX REV CODE 250 W/ 637 OVERRIDE(OP): Performed by: EMERGENCY MEDICINE

## 2020-11-03 PROCEDURE — 94640 AIRWAY INHALATION TREATMENT: CPT

## 2020-11-03 PROCEDURE — 93005 ELECTROCARDIOGRAM TRACING: CPT | Performed by: EMERGENCY MEDICINE

## 2020-11-03 PROCEDURE — 80053 COMPREHEN METABOLIC PANEL: CPT

## 2020-11-03 PROCEDURE — 96374 THER/PROPH/DIAG INJ IV PUSH: CPT

## 2020-11-03 PROCEDURE — 86140 C-REACTIVE PROTEIN: CPT

## 2020-11-03 PROCEDURE — C9803 HOPD COVID-19 SPEC COLLECT: HCPCS | Performed by: EMERGENCY MEDICINE

## 2020-11-03 PROCEDURE — 99214 OFFICE O/P EST MOD 30 MIN: CPT | Performed by: FAMILY MEDICINE

## 2020-11-03 PROCEDURE — 770020 HCHG ROOM/CARE - TELE (206)

## 2020-11-03 PROCEDURE — C9803 HOPD COVID-19 SPEC COLLECT: HCPCS

## 2020-11-03 PROCEDURE — 87086 URINE CULTURE/COLONY COUNT: CPT

## 2020-11-03 PROCEDURE — 94664 DEMO&/EVAL PT USE INHALER: CPT

## 2020-11-03 PROCEDURE — 700102 HCHG RX REV CODE 250 W/ 637 OVERRIDE(OP): Performed by: HOSPITALIST

## 2020-11-03 PROCEDURE — 99223 1ST HOSP IP/OBS HIGH 75: CPT | Performed by: HOSPITALIST

## 2020-11-03 PROCEDURE — 85025 COMPLETE CBC W/AUTO DIFF WBC: CPT

## 2020-11-03 PROCEDURE — 700111 HCHG RX REV CODE 636 W/ 250 OVERRIDE (IP): Performed by: EMERGENCY MEDICINE

## 2020-11-03 RX ORDER — METOPROLOL SUCCINATE 25 MG/1
25 TABLET, EXTENDED RELEASE ORAL DAILY
Status: DISCONTINUED | OUTPATIENT
Start: 2020-11-04 | End: 2020-11-04

## 2020-11-03 RX ORDER — POTASSIUM CHLORIDE 20 MEQ/1
20 TABLET, EXTENDED RELEASE ORAL ONCE
Status: COMPLETED | OUTPATIENT
Start: 2020-11-03 | End: 2020-11-03

## 2020-11-03 RX ORDER — DEXAMETHASONE SODIUM PHOSPHATE 4 MG/ML
4 INJECTION, SOLUTION INTRA-ARTICULAR; INTRALESIONAL; INTRAMUSCULAR; INTRAVENOUS; SOFT TISSUE ONCE
Status: COMPLETED | OUTPATIENT
Start: 2020-11-03 | End: 2020-11-03

## 2020-11-03 RX ORDER — ATORVASTATIN CALCIUM 80 MG/1
80 TABLET, FILM COATED ORAL DAILY
Status: DISCONTINUED | OUTPATIENT
Start: 2020-11-04 | End: 2020-11-07 | Stop reason: HOSPADM

## 2020-11-03 RX ORDER — GUAIFENESIN 600 MG/1
600 TABLET, EXTENDED RELEASE ORAL 2 TIMES DAILY PRN
Status: DISCONTINUED | OUTPATIENT
Start: 2020-11-03 | End: 2020-11-07 | Stop reason: HOSPADM

## 2020-11-03 RX ORDER — ONDANSETRON 2 MG/ML
4 INJECTION INTRAMUSCULAR; INTRAVENOUS ONCE
Status: COMPLETED | OUTPATIENT
Start: 2020-11-03 | End: 2020-11-03

## 2020-11-03 RX ORDER — ALBUTEROL SULFATE 90 UG/1
4 AEROSOL, METERED RESPIRATORY (INHALATION)
Status: COMPLETED | OUTPATIENT
Start: 2020-11-03 | End: 2020-11-03

## 2020-11-03 RX ORDER — DEXAMETHASONE 4 MG/1
4 TABLET ORAL EVERY 12 HOURS
Status: DISCONTINUED | OUTPATIENT
Start: 2020-11-04 | End: 2020-11-03

## 2020-11-03 RX ORDER — ONDANSETRON 4 MG/1
4 TABLET, ORALLY DISINTEGRATING ORAL EVERY 6 HOURS PRN
Status: DISCONTINUED | OUTPATIENT
Start: 2020-11-03 | End: 2020-11-07 | Stop reason: HOSPADM

## 2020-11-03 RX ORDER — ONDANSETRON 2 MG/ML
4 INJECTION INTRAMUSCULAR; INTRAVENOUS EVERY 6 HOURS PRN
Status: DISCONTINUED | OUTPATIENT
Start: 2020-11-03 | End: 2020-11-07 | Stop reason: HOSPADM

## 2020-11-03 RX ORDER — MV-MIN/VIT C/GLUT/LYSINE/HB124 250-12.5MG
1 TABLET,CHEWABLE ORAL DAILY
Status: ON HOLD | COMMUNITY
End: 2020-11-07

## 2020-11-03 RX ORDER — ACETAMINOPHEN 325 MG/1
650 TABLET ORAL EVERY 6 HOURS PRN
Status: DISCONTINUED | OUTPATIENT
Start: 2020-11-03 | End: 2020-11-07 | Stop reason: HOSPADM

## 2020-11-03 RX ORDER — DEXAMETHASONE 4 MG/1
6 TABLET ORAL EVERY 12 HOURS
Status: DISCONTINUED | OUTPATIENT
Start: 2020-11-04 | End: 2020-11-04

## 2020-11-03 RX ORDER — LABETALOL HYDROCHLORIDE 5 MG/ML
10 INJECTION, SOLUTION INTRAVENOUS EVERY 6 HOURS PRN
Status: DISCONTINUED | OUTPATIENT
Start: 2020-11-03 | End: 2020-11-04

## 2020-11-03 RX ORDER — MULTIVIT WITH MINERALS/LUTEIN
1000 TABLET ORAL DAILY
COMMUNITY

## 2020-11-03 RX ORDER — ASCORBIC ACID 500 MG
1000 TABLET ORAL DAILY
Status: DISCONTINUED | OUTPATIENT
Start: 2020-11-04 | End: 2020-11-07 | Stop reason: HOSPADM

## 2020-11-03 RX ADMIN — ONDANSETRON 4 MG: 2 INJECTION INTRAMUSCULAR; INTRAVENOUS at 18:34

## 2020-11-03 RX ADMIN — ACETAMINOPHEN 650 MG: 325 TABLET, FILM COATED ORAL at 22:45

## 2020-11-03 RX ADMIN — ALBUTEROL SULFATE 4 PUFF: 90 AEROSOL, METERED RESPIRATORY (INHALATION) at 18:25

## 2020-11-03 RX ADMIN — GUAIFENESIN 600 MG: 600 TABLET, EXTENDED RELEASE ORAL at 23:25

## 2020-11-03 RX ADMIN — POTASSIUM CHLORIDE 20 MEQ: 1500 TABLET, EXTENDED RELEASE ORAL at 23:25

## 2020-11-03 RX ADMIN — DEXAMETHASONE SODIUM PHOSPHATE 4 MG: 4 INJECTION, SOLUTION INTRA-ARTICULAR; INTRALESIONAL; INTRAMUSCULAR; INTRAVENOUS; SOFT TISSUE at 21:20

## 2020-11-03 SDOH — ECONOMIC STABILITY: TRANSPORTATION INSECURITY
IN THE PAST 12 MONTHS, HAS THE LACK OF TRANSPORTATION KEPT YOU FROM MEDICAL APPOINTMENTS OR FROM GETTING MEDICATIONS?: PATIENT DECLINED

## 2020-11-03 SDOH — ECONOMIC STABILITY: FOOD INSECURITY: WITHIN THE PAST 12 MONTHS, THE FOOD YOU BOUGHT JUST DIDN'T LAST AND YOU DIDN'T HAVE MONEY TO GET MORE.: PATIENT DECLINED

## 2020-11-03 SDOH — ECONOMIC STABILITY: TRANSPORTATION INSECURITY
IN THE PAST 12 MONTHS, HAS LACK OF TRANSPORTATION KEPT YOU FROM MEETINGS, WORK, OR FROM GETTING THINGS NEEDED FOR DAILY LIVING?: PATIENT DECLINED

## 2020-11-03 SDOH — ECONOMIC STABILITY: FOOD INSECURITY: WITHIN THE PAST 12 MONTHS, YOU WORRIED THAT YOUR FOOD WOULD RUN OUT BEFORE YOU GOT MONEY TO BUY MORE.: PATIENT DECLINED

## 2020-11-03 ASSESSMENT — ENCOUNTER SYMPTOMS
FEVER: 1
EYE DISCHARGE: 0
DOUBLE VISION: 0
VOMITING: 0
EYE REDNESS: 0
BLOOD IN STOOL: 0
PALPITATIONS: 0
SPEECH CHANGE: 0
DIAPHORESIS: 1
NAUSEA: 0
FOCAL WEAKNESS: 0
ABDOMINAL PAIN: 0
DIARRHEA: 1
DIZZINESS: 0
PHOTOPHOBIA: 0
WEAKNESS: 1
MYALGIAS: 1
SHORTNESS OF BREATH: 1
CHILLS: 1
NAUSEA: 0
SPUTUM PRODUCTION: 0
WHEEZING: 0
INSOMNIA: 1
LOSS OF CONSCIOUSNESS: 0
WEIGHT LOSS: 0
FALLS: 0
MYALGIAS: 0
HEADACHES: 0
NERVOUS/ANXIOUS: 0
COUGH: 1
VOMITING: 0

## 2020-11-03 ASSESSMENT — LIFESTYLE VARIABLES
EVER HAD A DRINK FIRST THING IN THE MORNING TO STEADY YOUR NERVES TO GET RID OF A HANGOVER: NO
TOTAL SCORE: 0
ALCOHOL_USE: NO
HAVE YOU EVER FELT YOU SHOULD CUT DOWN ON YOUR DRINKING: NO
CONSUMPTION TOTAL: NEGATIVE
EVER FELT BAD OR GUILTY ABOUT YOUR DRINKING: NO
HOW MANY TIMES IN THE PAST YEAR HAVE YOU HAD 5 OR MORE DRINKS IN A DAY: 0
AVERAGE NUMBER OF DAYS PER WEEK YOU HAVE A DRINK CONTAINING ALCOHOL: 1
HAVE PEOPLE ANNOYED YOU BY CRITICIZING YOUR DRINKING: NO
DOES PATIENT WANT TO STOP DRINKING: NO
ON A TYPICAL DAY WHEN YOU DRINK ALCOHOL HOW MANY DRINKS DO YOU HAVE: 3

## 2020-11-03 ASSESSMENT — PATIENT HEALTH QUESTIONNAIRE - PHQ9
SUM OF ALL RESPONSES TO PHQ9 QUESTIONS 1 AND 2: 0
1. LITTLE INTEREST OR PLEASURE IN DOING THINGS: NOT AT ALL
2. FEELING DOWN, DEPRESSED, IRRITABLE, OR HOPELESS: NOT AT ALL

## 2020-11-03 ASSESSMENT — FIBROSIS 4 INDEX
FIB4 SCORE: 1.8
FIB4 SCORE: 1.8
FIB4 SCORE: 2.08

## 2020-11-03 NOTE — PROGRESS NOTES
"Subjective:      Katelynn Hart is a 65 y.o. female who presents with Cough (feverish, diarrhea, x 7 days needs covid test for work)            1 week productive cough without blood in sputum.  Subjective fever and chills.  She has not tried to measure her temperature at home.  No shortness of breath.  She has lost her taste. Watery diarrhea without blood.  No nausea and vomiting.  No recent travel/camping/antibiotics.  No known COVID-19 exposures.  Symptoms are moderate to severe progressively worse.  No other aggravating or alleviating factors.      Review of Systems   Constitutional: Negative for malaise/fatigue and weight loss.   Eyes: Negative for discharge and redness.   Gastrointestinal: Negative for nausea and vomiting.   Musculoskeletal: Negative for joint pain and myalgias.   Skin: Negative for itching and rash.     .  Medications, Allergies, and current problem list reviewed today in Epic       Objective:     /60 (BP Location: Left arm, Patient Position: Sitting, BP Cuff Size: Adult)   Pulse 87   Temp (!) 38.2 °C (100.8 °F) (Temporal)   Resp 20   Ht 1.448 m (4' 9\")   Wt 65.3 kg (144 lb)   SpO2 93%   BMI 31.16 kg/m²      Physical Exam  Constitutional:       General: She is not in acute distress.     Appearance: She is well-developed.   HENT:      Head: Normocephalic and atraumatic.      Nose: Congestion present. No rhinorrhea.      Mouth/Throat:      Mouth: Mucous membranes are moist.      Pharynx: No posterior oropharyngeal erythema.   Eyes:      Conjunctiva/sclera: Conjunctivae normal.   Cardiovascular:      Rate and Rhythm: Normal rate and regular rhythm.      Heart sounds: Normal heart sounds. No murmur.   Pulmonary:      Breath sounds: No wheezing.      Comments: Tachypnea, rales most prominent left lung field  Skin:     General: Skin is warm and dry.      Findings: No rash.   Neurological:      Mental Status: She is alert and oriented to person, place, and time.               "   Assessment/Plan:       CXR per rad:  1.  Bilateral ill-defined pulmonary opacities suggesting multifocal pneumonia, and concerning for Covid 19 pneumonia.  2.   Hypoinflation with minimal bibasilar atelectasis.      1. Cough  COVID/SARS COV-2 PCR    DX-CHEST-2 VIEWS   2. Diarrhea, unspecified type  COVID/SARS COV-2 PCR   3. Fever, unspecified fever cause  DX-CHEST-2 VIEWS   4. Multifocal pneumonia       Differential diagnosis, natural history, supportive care, and indications for immediate follow-up discussed at length.     I am concerned about Katelynn's presentation.  She meets SIRS criteria by fever and respiratory rate.  She has no tachycardia but she is also taking a beta-blocker.  Given her age presentation and cardiac history I recommended to the ED for further evaluation and treatment.  This was discussed with Katelynn and her family and they agreed to go.  Given that she is not hypoxic the option of laboratory studies and close follow-up outpatient was also presented as a less optimal choice in my opinion.

## 2020-11-04 DIAGNOSIS — R19.7 DIARRHEA, UNSPECIFIED TYPE: ICD-10-CM

## 2020-11-04 DIAGNOSIS — R05.9 COUGH: ICD-10-CM

## 2020-11-04 LAB
ANION GAP SERPL CALC-SCNC: 10 MMOL/L (ref 7–16)
BUN SERPL-MCNC: 11 MG/DL (ref 8–22)
CALCIUM SERPL-MCNC: 8.7 MG/DL (ref 8.5–10.5)
CHLORIDE SERPL-SCNC: 99 MMOL/L (ref 96–112)
CO2 SERPL-SCNC: 26 MMOL/L (ref 20–33)
COVID ORDER STATUS COVID19: NORMAL
CREAT SERPL-MCNC: 0.52 MG/DL (ref 0.5–1.4)
ERYTHROCYTE [DISTWIDTH] IN BLOOD BY AUTOMATED COUNT: 41.2 FL (ref 35.9–50)
EST. AVERAGE GLUCOSE BLD GHB EST-MCNC: 128 MG/DL
GLUCOSE SERPL-MCNC: 168 MG/DL (ref 65–99)
HBA1C MFR BLD: 6.1 % (ref 0–5.6)
HCT VFR BLD AUTO: 42.4 % (ref 37–47)
HGB BLD-MCNC: 14.3 G/DL (ref 12–16)
MAGNESIUM SERPL-MCNC: 2.4 MG/DL (ref 1.5–2.5)
MCH RBC QN AUTO: 28.7 PG (ref 27–33)
MCHC RBC AUTO-ENTMCNC: 33.7 G/DL (ref 33.6–35)
MCV RBC AUTO: 85 FL (ref 81.4–97.8)
PLATELET # BLD AUTO: 229 K/UL (ref 164–446)
PMV BLD AUTO: 10.2 FL (ref 9–12.9)
POTASSIUM SERPL-SCNC: 4 MMOL/L (ref 3.6–5.5)
RBC # BLD AUTO: 4.99 M/UL (ref 4.2–5.4)
SODIUM SERPL-SCNC: 135 MMOL/L (ref 135–145)
TSH SERPL DL<=0.005 MIU/L-ACNC: 0.46 UIU/ML (ref 0.38–5.33)
WBC # BLD AUTO: 4.5 K/UL (ref 4.8–10.8)

## 2020-11-04 PROCEDURE — 83036 HEMOGLOBIN GLYCOSYLATED A1C: CPT

## 2020-11-04 PROCEDURE — 99232 SBSQ HOSP IP/OBS MODERATE 35: CPT | Performed by: STUDENT IN AN ORGANIZED HEALTH CARE EDUCATION/TRAINING PROGRAM

## 2020-11-04 PROCEDURE — 80048 BASIC METABOLIC PNL TOTAL CA: CPT

## 2020-11-04 PROCEDURE — A9270 NON-COVERED ITEM OR SERVICE: HCPCS | Performed by: STUDENT IN AN ORGANIZED HEALTH CARE EDUCATION/TRAINING PROGRAM

## 2020-11-04 PROCEDURE — 700102 HCHG RX REV CODE 250 W/ 637 OVERRIDE(OP): Performed by: HOSPITALIST

## 2020-11-04 PROCEDURE — 36415 COLL VENOUS BLD VENIPUNCTURE: CPT

## 2020-11-04 PROCEDURE — 83735 ASSAY OF MAGNESIUM: CPT

## 2020-11-04 PROCEDURE — A9270 NON-COVERED ITEM OR SERVICE: HCPCS | Performed by: HOSPITALIST

## 2020-11-04 PROCEDURE — 700102 HCHG RX REV CODE 250 W/ 637 OVERRIDE(OP): Performed by: STUDENT IN AN ORGANIZED HEALTH CARE EDUCATION/TRAINING PROGRAM

## 2020-11-04 PROCEDURE — 84443 ASSAY THYROID STIM HORMONE: CPT

## 2020-11-04 PROCEDURE — 85027 COMPLETE CBC AUTOMATED: CPT

## 2020-11-04 PROCEDURE — 770020 HCHG ROOM/CARE - TELE (206)

## 2020-11-04 PROCEDURE — 700111 HCHG RX REV CODE 636 W/ 250 OVERRIDE (IP): Performed by: HOSPITALIST

## 2020-11-04 RX ORDER — HYDRALAZINE HYDROCHLORIDE 20 MG/ML
5-10 INJECTION INTRAMUSCULAR; INTRAVENOUS EVERY 6 HOURS PRN
Status: DISCONTINUED | OUTPATIENT
Start: 2020-11-04 | End: 2020-11-07 | Stop reason: HOSPADM

## 2020-11-04 RX ORDER — DEXAMETHASONE 4 MG/1
6 TABLET ORAL DAILY
Status: DISCONTINUED | OUTPATIENT
Start: 2020-11-05 | End: 2020-11-07 | Stop reason: HOSPADM

## 2020-11-04 RX ORDER — CARBOXYMETHYLCELLULOSE SODIUM 5 MG/ML
1 SOLUTION/ DROPS OPHTHALMIC EVERY 8 HOURS
Status: DISCONTINUED | OUTPATIENT
Start: 2020-11-04 | End: 2020-11-07 | Stop reason: HOSPADM

## 2020-11-04 RX ORDER — METOPROLOL SUCCINATE 25 MG/1
12.5 TABLET, EXTENDED RELEASE ORAL DAILY
Status: DISCONTINUED | OUTPATIENT
Start: 2020-11-05 | End: 2020-11-07

## 2020-11-04 RX ADMIN — METOPROLOL SUCCINATE 25 MG: 25 TABLET, EXTENDED RELEASE ORAL at 06:04

## 2020-11-04 RX ADMIN — ASPIRIN 81 MG: 81 TABLET, COATED ORAL at 06:04

## 2020-11-04 RX ADMIN — DEXAMETHASONE 6 MG: 4 TABLET ORAL at 06:03

## 2020-11-04 RX ADMIN — CARBOXYMETHYLCELLULOSE SODIUM 1 DROP: 5 SOLUTION/ DROPS OPHTHALMIC at 16:50

## 2020-11-04 RX ADMIN — ENOXAPARIN SODIUM 40 MG: 40 INJECTION SUBCUTANEOUS at 06:04

## 2020-11-04 RX ADMIN — ATORVASTATIN CALCIUM 80 MG: 80 TABLET, FILM COATED ORAL at 06:03

## 2020-11-04 RX ADMIN — OXYCODONE HYDROCHLORIDE AND ACETAMINOPHEN 1000 MG: 500 TABLET ORAL at 06:03

## 2020-11-04 RX ADMIN — CARBOXYMETHYLCELLULOSE SODIUM 1 DROP: 5 SOLUTION/ DROPS OPHTHALMIC at 22:26

## 2020-11-04 ASSESSMENT — COGNITIVE AND FUNCTIONAL STATUS - GENERAL
MOVING TO AND FROM BED TO CHAIR: A LITTLE
SUGGESTED CMS G CODE MODIFIER MOBILITY: CK
WALKING IN HOSPITAL ROOM: A LITTLE
MOBILITY SCORE: 19
STANDING UP FROM CHAIR USING ARMS: A LITTLE
CLIMB 3 TO 5 STEPS WITH RAILING: A LITTLE
DRESSING REGULAR LOWER BODY CLOTHING: A LITTLE
DAILY ACTIVITIY SCORE: 23
SUGGESTED CMS G CODE MODIFIER DAILY ACTIVITY: CI
MOVING FROM LYING ON BACK TO SITTING ON SIDE OF FLAT BED: A LITTLE

## 2020-11-04 ASSESSMENT — ENCOUNTER SYMPTOMS
HALLUCINATIONS: 0
NERVOUS/ANXIOUS: 0
DIARRHEA: 0
WHEEZING: 0
DEPRESSION: 0
BACK PAIN: 0
EYE REDNESS: 0
SORE THROAT: 0
SENSORY CHANGE: 0
COUGH: 1
HEARTBURN: 0
VOMITING: 0
FEVER: 1
NAUSEA: 0
PALPITATIONS: 0
INSOMNIA: 0
SHORTNESS OF BREATH: 0
FOCAL WEAKNESS: 0
DIZZINESS: 0
WEIGHT LOSS: 0
TINGLING: 0
EYE DISCHARGE: 0
FALLS: 0
ABDOMINAL PAIN: 0
HEADACHES: 0
CONSTIPATION: 0
CHILLS: 1

## 2020-11-04 ASSESSMENT — LIFESTYLE VARIABLES: SUBSTANCE_ABUSE: 0

## 2020-11-04 NOTE — H&P
Hospital Medicine History & Physical Note    Date of Service  11/3/2020    Primary Care Physician  Pcp Pt States None    Consultants  None    Code Status  Full Code    Chief Complaint  Chief Complaint   Patient presents with   • Cough     x 1 week. sent by MD for covid symptoms. had xray done in urgent care w/c shows pneumonia and suspected for covid 19   • Fever   • Chest Pain     x 1 week. states worse during coughing.       History of Presenting Illness  65 y.o. female who presented 11/3/2020 with generalized malaise, fever and chills x1 week. She had been seen at Urgent Care and it was recommended that she come in for further evaluation. She endorses fever, chills, congestion. pleuritic chest pain, SOB, cough and diarrhea for the last 7 days. She denies any sick contacts at home but has been working, cleaning in a hotel.     In the ED, she was found to be COVID positive, afebrile, increased RR to 34 and saturating 87% on RA. She was given albuterol, zofran, and decadron in the ED. D dimer and CRP elevated. Procal negative.      services were used in the patient's primary language of Tristanian.   Name or Number: [id number]  Mode of interpretation: iPad    Review of Systems  Review of Systems   Constitutional: Positive for chills, diaphoresis, fever and malaise/fatigue.   HENT: Positive for congestion.    Eyes: Negative for double vision and photophobia.   Respiratory: Positive for cough and shortness of breath. Negative for sputum production and wheezing.    Cardiovascular: Negative for chest pain, palpitations and leg swelling.   Gastrointestinal: Positive for diarrhea. Negative for abdominal pain, blood in stool, nausea and vomiting.   Genitourinary: Negative for dysuria.   Musculoskeletal: Positive for myalgias. Negative for falls.   Neurological: Positive for weakness. Negative for dizziness, speech change, focal weakness, loss of consciousness and headaches.   Psychiatric/Behavioral: The  patient has insomnia. The patient is not nervous/anxious.    All other systems reviewed and are negative.      Past Medical History   has a past medical history of CAD (coronary artery disease) (06/2016), Hyperlipidemia, and Kidney stones.    Surgical History   has a past surgical history that includes lithotripsy; angioplasty; and zzz cardiac cath (6/5/16).     Family History  family history includes Cancer in her maternal aunt, maternal uncle, and mother; Diabetes in her father; Hypertension in her father, maternal aunt, maternal uncle, and sister.   Reports mother and maternal and with unknown cancer.     Social History   reports that she has never smoked. She has never used smokeless tobacco. She reports that she does not drink alcohol or use drugs.  Denies tobacco, alcohol or illicit drug use.     Allergies  No Known Allergies    Medications  Prior to Admission Medications   Prescriptions Last Dose Informant Patient Reported? Taking?   Ascorbic Acid (VITAMIN C) 1000 MG Tab 11/2/2020 at Unknown time  Yes Yes   Sig: Take 1,000 mg by mouth every day.   Multiple Vitamins-Minerals (AIRBORNE GUMMIES) Chew Tab 11/2/2020 at Unknown time  Yes Yes   Sig: Take 1 Tab by mouth every day.   aspirin EC (ECOTRIN) 81 MG Tablet Delayed Response 11/2/2020 at Unknown time  No No   Sig: Take 1 Tab by mouth every day.   atorvastatin (LIPITOR) 80 MG tablet 11/2/2020 at Unknown time  No No   Sig: Take 1 Tab by mouth every day.   metoprolol SR (TOPROL XL) 25 MG TABLET SR 24 HR 11/2/2020 at Unknown time  No No   Sig: Take 1 Tab by mouth every day.   nitroglycerin (NITROSTAT) 0.4 MG SL Tab Not Taking at Unknown time  No No   Sig: Place 1 Tab under tongue as needed for Chest Pain.   Patient not taking: Reported on 11/3/2020      Facility-Administered Medications: None       Physical Exam  Temp:  [37.7 °C (99.8 °F)] 37.7 °C (99.8 °F)  Pulse:  [72-95] 72  Resp:  [13-34] 20  BP: (102-121)/(51-71) 117/53  SpO2:  [87 %-98 %] 98 %    Physical  Exam  Vitals signs reviewed.   Constitutional:       General: She is not in acute distress.     Appearance: She is overweight. She is ill-appearing and diaphoretic.      Interventions: Nasal cannula in place.   HENT:      Head: Normocephalic.      Mouth/Throat:      Comments: Tacky mucous membranes  Eyes:      General: No scleral icterus.        Right eye: No discharge.         Left eye: No discharge.      Extraocular Movements: Extraocular movements intact.   Neck:      Musculoskeletal: Normal range of motion. No neck rigidity.   Cardiovascular:      Rate and Rhythm: Regular rhythm. Tachycardia present.      Pulses: Normal pulses.   Pulmonary:      Effort: Pulmonary effort is normal. No respiratory distress.      Breath sounds: Rales present. No wheezing.   Abdominal:      General: There is no distension.      Palpations: Abdomen is soft.      Tenderness: There is no abdominal tenderness. There is no guarding or rebound.   Musculoskeletal:         General: No swelling.      Right lower leg: No edema.      Left lower leg: No edema.   Skin:     General: Skin is warm.      Coloration: Skin is not jaundiced.   Neurological:      General: No focal deficit present.      Mental Status: She is alert and oriented to person, place, and time.      Cranial Nerves: No cranial nerve deficit.   Psychiatric:         Mood and Affect: Mood normal.         Speech: Speech normal.         Behavior: Behavior normal. Behavior is cooperative.         Laboratory:  Recent Labs     11/03/20  1636   WBC 8.3   RBC 4.70   HEMOGLOBIN 13.5   HEMATOCRIT 39.6   MCV 84.3   MCH 28.7   MCHC 34.1   RDW 41.1   PLATELETCT 229   MPV 10.3     Recent Labs     11/03/20  1636   SODIUM 127*   POTASSIUM 3.2*   CHLORIDE 92*   CO2 24   GLUCOSE 110*   BUN 10   CREATININE 0.58   CALCIUM 8.4*     Recent Labs     11/03/20  1636   ALTSGPT 19   ASTSGOT 33   ALKPHOSPHAT 98   TBILIRUBIN 0.5   GLUCOSE 110*         No results for input(s): NTPROBNP in the last 72 hours.       Recent Labs     11/03/20  1636   TROPONINT 8       Imaging:  No orders to display         Assessment/Plan:  I anticipate this patient will require at least two midnights for appropriate medical management, necessitating inpatient admission.    * Acute hypoxemic respiratory failure due to COVID-19 (HCC)- (present on admission)  Assessment & Plan  CRP and D dimer elevated. Hypoxemic to 87% on RA.  - RT protocol  - monitor inflammatory markers q48 hours  - supplemental O2 as needed  - continue with decadron 4mg BID  - discussed the importance of prone positioning  - procal negative therefore no indication for antibiotics at this time  - supportive care: anti-emetics, cough syrup    Coronary artery disease: MIKI to LAD ×2 and 6/2016- (present on admission)  Assessment & Plan  Denies active chest pain.   - continue asa and statin  - outpatient follow up with cardiology; last seen Sept 2019 with Dr. Johns    Hyponatremia- (present on admission)  Assessment & Plan  Suspect hypovolemic hyponatremia  - judicious use of fluids; will encourage PO fluid intake today   - repeat sodium in the AM    Hypokalemia- (present on admission)  Assessment & Plan  Mild at 3.2  - monitor and replete; Kdur 20meq ordered x1  - magnesium level pending    Essential hypertension- (present on admission)  Assessment & Plan  Normotensive.   - resume home metop XL    Obesity- (present on admission)  Assessment & Plan  Body mass index is 36.04 kg/m².

## 2020-11-04 NOTE — PROGRESS NOTES
Received report and assumed care of patient. Patient is alert and oriented x4 Mohawk speaking, this RN speaks Mohawk and able to communicate. Patient is in no signs of distress at this time and reports pain only when coughing. Oxygen at 6L NC saturation is 99% will titrate down as appropriate. Patient was updated on the plan of care for the day. Call light within reach, bed in low position, 2 side rails up. All fall precautions in place. Will continue to monitor.

## 2020-11-04 NOTE — ED NOTES
Patient to RD 2, PIV access obtained, 2nd set of blood cultures drawn and sent to lab along with urine and COVID swab, ERP at bedside at this time.

## 2020-11-04 NOTE — ASSESSMENT & PLAN NOTE
Ferritin and LDH elevated.  96% on 2 L nasal cannula  - RT protocol  - monitor inflammatory markers q48 hours  - supplemental O2 as needed  - continue with decadron 6 mg daily  - discussed the importance of prone positioning  - procal negative therefore no indication for antibiotics at this time  - supportive care: anti-emetics, cough syrup  -Under droplet contact isolation

## 2020-11-04 NOTE — PROGRESS NOTES
IS given to patient and instructions given via Ipad with . Pt was able to achieve results of IS  up to 750.

## 2020-11-04 NOTE — CARE PLAN
Problem: Pain Management  Goal: Pain level will decrease to patient's comfort goal  Outcome: PROGRESSING AS EXPECTED     Problem: Respiratory:  Goal: Respiratory status will improve  Outcome: PROGRESSING AS EXPECTED  Intervention: Assess and monitor pulmonary status  Flowsheets  Taken 11/3/2020 2314 by Taylor Coon R.N.  RUL Breath Sounds: Clear  RML Breath Sounds:   Diminished   Clear  RLL Breath Sounds: Diminished  ROSAMARIA Breath Sounds: Clear  LLL Breath Sounds: Diminished  Taken 11/3/2020 2239 by Taylor Coon R.N.  SpO2: 95 %  O2 (LPM): 6  Taken 11/3/2020 1902 by Nat Mccarthy R.N.  Resp: 20  Taken 11/3/2020 1729 by Nat Mccarthy R.N.  Cough:   Dry   Strong  Intervention: Administer and titrate oxygen therapy  Flowsheets (Taken 11/3/2020 2239)  O2 (LPM): 6  Intervention: Educate and encourage coughing and deep breathing  Note: Education provided      Intervention: Educate and encourage incentive spirometry usage  Note: Incentive spirometer given

## 2020-11-04 NOTE — CARE PLAN
Problem: Safety  Goal: Will remain free from injury  Note: Bed alarm on, non slip socks on, bed in low position, 2 side rails up, call light within reach, will continue to monitor.      Problem: Knowledge Deficit  Goal: Knowledge of disease process/condition, treatment plan, diagnostic tests, and medications will improve  Note: Patient updated on plan of care for the day, patient verbalized understanding. All questions and concerns addressed at this time.

## 2020-11-04 NOTE — PROGRESS NOTES
Report received at bedside in ED by RN, patient care assumed. Placed on ZOLL monitor and transferred to unit. Tele box on. Monitor room informed. POC updated on white board Patient states pain is 0. VS taken. Fall precautions in place with bed in lowest position, treaded sock slippers on, and call light within reach. Will continue assess and monitor cognitive function and any physiological changes.

## 2020-11-04 NOTE — PROGRESS NOTES
[facility] TRI[facility]IC[facility] ER REPORT  Consult/Admission requested by: Dr Raygoza  Chief complaint: SOB  Pertinent history/ER Course: 69-year-old female presented with breath, fever for 1 week, hypoxia, found to have COVID-19 pneumonia.  Code Status: full per ERP, I personally verified with the ERP patient's code status and the ERP has confirmed this with the patient.   Patient meets admission criterion: Yes..  Recommendations given or work up & consultations requested per triage officer: tx  for Covid 19 pneumonia  Consultants involved and pertinent input from consultants:   Admission status: Inpatient.   Admission order placed: Yes.   Floor requested: covid  Assigned hospitalist: Dr Reich

## 2020-11-04 NOTE — PROGRESS NOTES
· 2 RN skin check complete with REBEL Courtney.  · Devices in place NC.  · Confirmed pressure ulcers found on NA.  · New potential pressure ulcers noted on NA.  Wound consult placed and wound reported.  · The following interventions in place SCD's, moisturizer, pillows for support.      Pt generalized skin integrity clean, warm, dry and intact.   Moisturizer given for dryness.

## 2020-11-04 NOTE — ED PROVIDER NOTES
ED Provider Note    Scribed for Nahid Raygoza M.D. by Gonzalez Kinney. 11/3/2020, 5:19 PM.    I verified that the patient was wearing a mask and I was wearing appropriate PPE every time I entered the room. The patient's mask was on the patient at all times during my encounter except for a brief view of the oropharynx.    Primary care provider: CEDRICK Rojas  Means of arrival: walk in  History obtained from: patient  History limited by: none    CHIEF COMPLAINT  Chief Complaint   Patient presents with   • Cough     x 1 week. sent by MD for covid symptoms. had xray done in urgent care w/c shows pneumonia and suspected for covid 19   • Fever   • Chest Pain     x 1 week. states worse during coughing.       YANNI Hart is a 65 y.o. female who presents to the Emergency Department with moderate chest pain and a productive cough onset 1 week ago. Per the patient, coughing is what triggers her chest pain. The patient reports additional symptoms of a fever, body aches, nausea, vomiting, diarrhea, and change in taste for the past week, and denies sore throat and headache. No other exacerbating or alleviating factors were noted. She also denies being on supplementary oxygen, exposure to COVID, lung problems such as asthma or emphysema, smoking, or history of blood clots, and adds that she has had CAD.     REVIEW OF SYSTEMS  Pertinent positives include cough, fever, chest pain,  body aches, nausea, vomiting, diarrhea, and change in taste for the past week. Pertinent negatives include sore throat and headache. All other systems negative.    PAST MEDICAL HISTORY   has a past medical history of CAD (coronary artery disease) (06/2016), Hyperlipidemia, and Kidney stones.    SURGICAL HISTORY   has a past surgical history that includes lithotripsy; angioplasty; and zzz cardiac cath (6/5/16).    SOCIAL HISTORY  Social History     Tobacco Use   • Smoking status: Never Smoker   • Smokeless tobacco: Never  "Used   Substance Use Topics   • Alcohol use: No   • Drug use: No      Social History     Substance and Sexual Activity   Drug Use No       FAMILY HISTORY  Family History   Problem Relation Age of Onset   • Cancer Mother         bone cancer   • Hypertension Father    • Diabetes Father    • Hypertension Sister    • Hypertension Maternal Aunt    • Cancer Maternal Aunt    • Cancer Maternal Uncle    • Hypertension Maternal Uncle        CURRENT MEDICATIONS  Home Medications     Reviewed by Devin Valdez (Pharmacy Tech) on 11/03/20 at 2031  Med List Status: Complete   Medication Last Dose Status   Ascorbic Acid (VITAMIN C) 1000 MG Tab 11/2/2020 Active   aspirin EC (ECOTRIN) 81 MG Tablet Delayed Response 11/2/2020 Active   atorvastatin (LIPITOR) 80 MG tablet 11/2/2020 Active   metoprolol SR (TOPROL XL) 25 MG TABLET SR 24 HR 11/2/2020 Active   Multiple Vitamins-Minerals (AIRBORNE GUMMIES) Chew Tab 11/2/2020 Active   nitroglycerin (NITROSTAT) 0.4 MG SL Tab Not Taking Active                ALLERGIES  No Known Allergies    PHYSICAL EXAM  VITAL SIGNS: /71   Pulse 92   Temp 37.7 °C (99.8 °F) (Temporal)   Resp 18   Ht 1.346 m (4' 5\")   Wt 65.3 kg (144 lb)   SpO2 92%   BMI 36.04 kg/m²     Constitutional: Well developed, Well nourished, moderate distress.   HENT: Normocephalic, Atraumatic, Mask in place  Eyes: Conjunctiva normal, No discharge.    Cardiovascular: Normal heart rate, Normal rhythm, No murmurs, equal pulses.   Pulmonary: Rales in the bilateral bases, No respiratory distress, No wheezing, No rhonchi.  Chest: Reproducible chest pain with palpation over the sternum. No chest wall deformity.   Abdomen:Soft, No tenderness, No masses, no rebound, no guarding.   Back: No CVA tenderness.   Musculoskeletal: No major deformities noted, No tenderness.  No edema in her legs, no calf tenderness  Skin: Warm, Dry, No erythema, No rash.   Neurologic: Alert & oriented x 3, Normal motor function,  No focal deficits " noted.   Psychiatric: Affect normal, Judgment normal, Mood normal.     LABS  Results for orders placed or performed during the hospital encounter of 11/03/20   Lactic acid (lactate)   Result Value Ref Range    Lactic Acid 1.7 0.5 - 2.0 mmol/L   CBC WITH DIFFERENTIAL   Result Value Ref Range    WBC 8.3 4.8 - 10.8 K/uL    RBC 4.70 4.20 - 5.40 M/uL    Hemoglobin 13.5 12.0 - 16.0 g/dL    Hematocrit 39.6 37.0 - 47.0 %    MCV 84.3 81.4 - 97.8 fL    MCH 28.7 27.0 - 33.0 pg    MCHC 34.1 33.6 - 35.0 g/dL    RDW 41.1 35.9 - 50.0 fL    Platelet Count 229 164 - 446 K/uL    MPV 10.3 9.0 - 12.9 fL    Neutrophils-Polys 68.80 44.00 - 72.00 %    Lymphocytes 26.70 22.00 - 41.00 %    Monocytes 3.90 0.00 - 13.40 %    Eosinophils 0.00 0.00 - 6.90 %    Basophils 0.10 0.00 - 1.80 %    Immature Granulocytes 0.50 0.00 - 0.90 %    Nucleated RBC 0.00 /100 WBC    Neutrophils (Absolute) 5.69 2.00 - 7.15 K/uL    Lymphs (Absolute) 2.21 1.00 - 4.80 K/uL    Monos (Absolute) 0.32 0.00 - 0.85 K/uL    Eos (Absolute) 0.00 0.00 - 0.51 K/uL    Baso (Absolute) 0.01 0.00 - 0.12 K/uL    Immature Granulocytes (abs) 0.04 0.00 - 0.11 K/uL    NRBC (Absolute) 0.00 K/uL   COMP METABOLIC PANEL   Result Value Ref Range    Sodium 127 (L) 135 - 145 mmol/L    Potassium 3.2 (L) 3.6 - 5.5 mmol/L    Chloride 92 (L) 96 - 112 mmol/L    Co2 24 20 - 33 mmol/L    Anion Gap 11.0 7.0 - 16.0    Glucose 110 (H) 65 - 99 mg/dL    Bun 10 8 - 22 mg/dL    Creatinine 0.58 0.50 - 1.40 mg/dL    Calcium 8.4 (L) 8.5 - 10.5 mg/dL    AST(SGOT) 33 12 - 45 U/L    ALT(SGPT) 19 2 - 50 U/L    Alkaline Phosphatase 98 30 - 99 U/L    Total Bilirubin 0.5 0.1 - 1.5 mg/dL    Albumin 3.6 3.2 - 4.9 g/dL    Total Protein 8.0 6.0 - 8.2 g/dL    Globulin 4.4 (H) 1.9 - 3.5 g/dL    A-G Ratio 0.8 g/dL   URINALYSIS    Specimen: Blood   Result Value Ref Range    Color DK Yellow     Character Cloudy (A)     Specific Gravity 1.024 <1.035    Ph 6.0 5.0 - 8.0    Glucose Negative Negative mg/dL    Ketones 40 (A)  Negative mg/dL    Protein 300 (A) Negative mg/dL    Bilirubin Negative Negative    Urobilinogen, Urine 1.0 Negative    Nitrite Negative Negative    Leukocyte Esterase Trace (A) Negative    Occult Blood Negative Negative    Micro Urine Req Microscopic    ESTIMATED GFR   Result Value Ref Range    GFR If African American >60 >60 mL/min/1.73 m 2    GFR If Non African American >60 >60 mL/min/1.73 m 2   TROPONIN   Result Value Ref Range    Troponin T 8 6 - 19 ng/L   COVID/SARS CoV-2 PCR    Specimen: Nasopharyngeal; Respirate   Result Value Ref Range    COVID Order Status Received    D-Dimer   Result Value Ref Range    D-Dimer Screen 1.02 (H) 0.00 - 0.50 ug/mL (FEU)   CRP Quantitative (Non-Cardiac)   Result Value Ref Range    Stat C-Reactive Protein 8.38 (H) 0.00 - 0.75 mg/dL   Procalcitonin   Result Value Ref Range    Procalcitonin <0.05 <0.25 ng/mL   URINE MICROSCOPIC (W/UA)   Result Value Ref Range    WBC 2-5 /hpf    RBC 0-2 /hpf    Bacteria Negative None /hpf    Epithelial Cells Moderate (A) /hpf    Hyaline Cast 3-5 (A) /lpf   SARS-CoV-2, PCR (In-House)   Result Value Ref Range    SARS-CoV-2 Source NP Swab     SARS-CoV-2 by PCR DETECTED (AA)    EKG (NOW)   Result Value Ref Range    Report       [facility] Emergency Dept.    Test Date:  2020  Pt Name:    ZEKE ROSS               Department: ER  MRN:        [id number]                      Room:  Gender:     Female                       Technician: [id number]  :        [id number]                   Requested By:ER TRIAGE PROTOCOL  Order #:    [id number]                    Reading MD: DANNA DUPREE MD    Measurements  Intervals                                Axis  Rate:       98                           P:          44  SD:         128                          QRS:        1  QRSD:       96                           T:          -18  QT:         328  QTc:        419    Interpretive Statements  Sinus rhythm  Inferior infarct, age  indeterminate  Baseline wander in lead(s) II,III,aVF  Compared to ECG 07/20/2020 17:07:12  Myocardial infarct finding now present  T-wave abnormality no longer present  Electronically Signed On 11-3-2020 20:09:01 PST by NAHID DUPREE MD       All labs reviewed by me.    EKG  12 Lead EKG interpreted by me as above      COURSE & MEDICAL DECISION MAKING  Pertinent Labs & Imaging studies reviewed. (See chart for details)    5:19 PM - Patient seen and examined at bedside. Ordered Lactic acid, Estimated GFR, CBC w/diff, CMP, UA, Urine culture, Blood culture, and an EKG to evaluate her symptoms. The differential diagnoses include but are not limited to: Pneumonia, covid     6:04 PM - I ordered Troponin, COVID/SARS CoV-2 PCR, D-dimer, CRP quantitative, Procalcitonin, and Interleukin 6 to evaluate, and an albuterol inhaler 4 puff and Zofran 4 mg to treat.        Medical Decision Making: Patient presents with hypoxia and cough.  At this point time it appears she has COVID-19 pneumonia.  Given the hypoxia she was given a dose of dexamethasone.  She will be admitted to the hospital for further oxygen support and monitoring.    DISPOSITION:  Patient will be hospitalized by Dr. Mosqueda in guarded condition.     FINAL IMPRESSION  1. Pneumonia due to COVID-19 virus    2. Hypoxia          Gonzalez LOWRY (Rebecca), am scribing for, and in the presence of, Nahid Dupree M.D.    Electronically signed by: Gonzalez Kinney (Rebecca), 11/3/2020    Nahid LOWRY M.D. personally performed the services described in this documentation, as scribed by Gonzalez Kinney in my presence, and it is both accurate and complete.    C    The note accurately reflects work and decisions made by me.  Nahid Dupree M.D.  11/3/2020  8:40 PM

## 2020-11-04 NOTE — PROGRESS NOTES
[facility] Medicine Daily Progress Note    Date of Service  11/4/2020    Chief Complaint  65 y.o. female admitted 11/3/2020 with cough fever and chest pain for 1 week    Hospital Course  65 y.o. female who presented 11/3/2020 with generalized malaise, fever and chills x1 week.   In the ED, she was found to be COVID positive, afebrile, increased RR to 34 and saturating 87% on RA. She was given albuterol, zofran, and decadron in the ED. D dimer and CRP elevated. Procal negative.   She was admitted; O2 sat 96 on 4 L nasal cannula, mild cough when taking deep breath.  Decadron 60 mg daily.  Heart rate was found to be in the 40s; decrease metoprolol from 25 mg to 12.5.       Interval Problem Update  Mild cough, denies pain or shortness of breath; guaifenesin ordered;    Consultants/Specialty  None    Code Status  Full Code    Disposition  Likely home    Review of Systems  Review of Systems   Constitutional: Positive for chills and fever. Negative for malaise/fatigue and weight loss.   HENT: Negative for congestion and sore throat.    Eyes: Negative for discharge and redness.   Respiratory: Positive for cough. Negative for shortness of breath and wheezing.    Cardiovascular: Positive for chest pain. Negative for palpitations and leg swelling.   Gastrointestinal: Negative for abdominal pain, constipation, diarrhea, heartburn, nausea and vomiting.   Genitourinary: Negative for dysuria, frequency and urgency.   Musculoskeletal: Negative for back pain, falls and joint pain.   Skin: Negative for itching and rash.   Neurological: Negative for dizziness, tingling, sensory change, focal weakness and headaches.   Psychiatric/Behavioral: Negative for depression, hallucinations and substance abuse. The patient is not nervous/anxious and does not have insomnia.    All other systems reviewed and are negative.       Physical Exam  Temp:  [36.8 °C (98.2 °F)-39.1 °C (102.4 °F)] 36.8 °C (98.2 °F)  Pulse:  [45-95] 45  Resp:  [13-34] 24  BP:  (101-121)/(51-71) 108/67  SpO2:  [87 %-99 %] 96 %    Physical Exam  Vitals signs reviewed.   Constitutional:       General: She is not in acute distress.     Appearance: Normal appearance. She is normal weight. She is not diaphoretic.   HENT:      Head: Normocephalic and atraumatic.      Right Ear: External ear normal.      Left Ear: External ear normal.      Nose: Nose normal.      Mouth/Throat:      Pharynx: Oropharynx is clear.   Eyes:      General:         Right eye: No discharge.         Left eye: No discharge.      Extraocular Movements: Extraocular movements intact.      Conjunctiva/sclera: Conjunctivae normal.      Pupils: Pupils are equal, round, and reactive to light.   Neck:      Musculoskeletal: Normal range of motion. No neck rigidity or muscular tenderness.   Cardiovascular:      Rate and Rhythm: Normal rate and regular rhythm.      Pulses: Normal pulses.      Heart sounds: Normal heart sounds.   Pulmonary:      Effort: Pulmonary effort is normal. No respiratory distress.      Breath sounds: Normal breath sounds. No wheezing.      Comments: Mild crackles bilateral lower lobe  Abdominal:      General: Abdomen is flat. Bowel sounds are normal. There is no distension.      Palpations: Abdomen is soft.      Tenderness: There is no abdominal tenderness. There is no right CVA tenderness, left CVA tenderness, guarding or rebound.   Musculoskeletal: Normal range of motion.         General: No swelling, tenderness or deformity.      Right lower leg: No edema.      Left lower leg: No edema.   Skin:     General: Skin is warm and dry.   Neurological:      General: No focal deficit present.      Mental Status: She is alert and oriented to person, place, and time. Mental status is at baseline.      Cranial Nerves: No cranial nerve deficit.      Sensory: No sensory deficit.      Gait: Gait normal.      Deep Tendon Reflexes: Reflexes normal.   Psychiatric:         Mood and Affect: Mood normal.         Behavior:  Behavior normal.         Judgment: Judgment normal.         Fluids  No intake or output data in the 24 hours ending 11/04/20 1145    Laboratory  Recent Labs     11/03/20  1636 11/04/20  0706   WBC 8.3 4.5*   RBC 4.70 4.99   HEMOGLOBIN 13.5 14.3   HEMATOCRIT 39.6 42.4   MCV 84.3 85.0   MCH 28.7 28.7   MCHC 34.1 33.7   RDW 41.1 41.2   PLATELETCT 229 229   MPV 10.3 10.2     Recent Labs     11/03/20  1636 11/04/20  0706   SODIUM 127* 135   POTASSIUM 3.2* 4.0   CHLORIDE 92* 99   CO2 24 26   GLUCOSE 110* 168*   BUN 10 11   CREATININE 0.58 0.52   CALCIUM 8.4* 8.7                   Imaging  No orders to display        Assessment/Plan  * Acute hypoxemic respiratory failure due to COVID-19 (HCC)- (present on admission)  Assessment & Plan  CRP and D dimer elevated. Hypoxemic to 87% on RA.  - RT protocol  - monitor inflammatory markers q48 hours  - supplemental O2 as needed  - continue with decadron 6 mg daily  - discussed the importance of prone positioning  - procal negative therefore no indication for antibiotics at this time  - supportive care: anti-emetics, cough syrup  -Under droplet contact isolation    Coronary artery disease: MIKI to LAD ×2 and 6/2016- (present on admission)  Assessment & Plan  Denies active chest pain.   - continue asa and statin  - outpatient follow up with cardiology; last seen Sept 2019 with Dr. Johns    Hyponatremia- (present on admission)  Assessment & Plan  resolved    Hypokalemia- (present on admission)  Assessment & Plan  resolved  - magnesium level normal    Essential hypertension- (present on admission)  Assessment & Plan  Normotensive.   -Lower metroprolol XL to 12.5mg daily for bradycardia  - monitor    Obesity- (present on admission)  Assessment & Plan  Body mass index is 36.04 kg/m².       VTE prophylaxis: lovenox

## 2020-11-04 NOTE — ASSESSMENT & PLAN NOTE
Normotensive.   -Lower metroprolol XL to 12.5mg daily for bradycardia  - monitor   PT Acute Evaluation     Therapy Triage Evaluation: PT evaluation indicated due to decline in functional mobility, transfers and/or other PT related issues.           Pt seen on SICU nursing unit.                                                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 'I am happy be to be getting up moving a bit\" (10/09/18 1115)  Subjective/Objective Comments: RN Tatiana aware of session and present as needed, pt up in chair at end of session (10/09/18 1115)    OBJECTIVE:  Basic Lines: IV;Drain;[place] catheter;Telemetry;Continuous pulse oximetry;Central line;Complex lines (10/09/18 1115)  Complex Lines: Arterial line (10/09/18 1115)    RN reported Yan Fall Scale Score: 35    Co-morbidities:   Patient Active Problem List   Diagnosis   • Benign non-nodular prostatic hyperplasia with lower urinary tract symptoms   • Chronic nasal congestion   • Monoclonal gammopathy   • DISH (diffuse idiopathic skeletal hyperostosis)   • Primary myelofibrosis (CMS/HCC)   • Thrombocytosis (CMS/HCC)   • Dehydration   • Elevated troponin   • Lightheaded   • Orthostatic hypotension   • Exertional dyspnea   • Colon stricture (CMS/HCC)   • (HFpEF) heart failure with preserved ejection fraction (CMS/HCC)       Clinical presentation: evolving clinical presentation with changing characteristics      ASSESSMENT:   Pt seen for evaluation on POD #1 s/p laparoscopic low anterior colon resection. [place] wife and has 9 steps to enter his  home. Pt is independent at baseline but does report he has a cane and WW if needed at d/c. Pt overall needs Min to Mod A today with RN present to assist with lines as well as monitoring BP as pt does have orthostatic hypotension but is mainly asymptomatic throughout evaluation today. Pt is eager to participate in mobility and completes 2 standing trials with WW and ambulates a short distance to chair today. Pt is appropriate for skilled PT services to address deficits in functional mobility after surgery. Nurse   educated on safe mobility activity. Standing and short distance ambulation with WW with 2nd person for safety with lines as needed.     Other Therapeutic Intervention: extra time for monitoring vitals and line management during sesion (10/09/18 1115)     EDUCATION:   On this date, the patient and patient's spouse was educated on role of PT and goals, increasing activity after surgery.    The response to education was: Verbalizes understanding and Demonstrates understanding    PT Identified Barriers to Discharge: medical needs, impaired mobility after surgery     PLAN:   Continue skilled PT, including the following Treatment/Interventions: Functional transfer training;Bed mobility;Gait training;Stairs retraining (10/09/18 1115)   Frequency Comments: M-F (10/09/18 1115)    Treatment Plan for Next Session: bed mobility using log roll, transfers, progress ambulation with WW          RECOMMENDATIONS FOR DISCHARGE:  Recommendation for Discharge: PT: Home(pending progress) (10/09/18 1115)    PT/OT Mobility Equipment for Discharge: pt has a cane and WW if needed (10/09/18 1115)  PT/OT ADL Equipment for Discharge: Do not anticipate needs, will follow up closer to D/C.  (10/09/18 9408)    Assistance needed when returning home:   Anticipate pt will progress to d/c home once medically stable.       ICU Mobility Assesment (PERME):       Last 24 hours of Functional Data  Bed Mobility   Bed Mobility  Supine to Sit:  Moderate Assist (Mod) (10/09/18 1115)  Bed Mobility Comments: cues for sequencing using log roll with assist to lift trunk and scoot hips forward to EOB (10/09/18 1115)    Transfers  Transfers  Sit to Stand: Minimal Assist (Min) (10/09/18 1115)  Stand to Sit: Minimal Assist (Min) (10/09/18 1115)  Assistive Device/: 2-wheeled walker (10/09/18 1115)  Transfer Comments 1: RN present to assist with lines and monitoring BP but pt overall needs Min A for anterior weight shiftng from EOB and recliner with cues for hand placement (10/09/18 1115)      Gait  Gait  Gait Assistance: Minimal Assist (Min) (10/09/18 1115)  Assistive Device/: 2-wheeled walker (10/09/18 1115)  Ambulation Distance (Feet): 5 Feet (10/09/18 1115)  Gait Comments 1: RN present to assist wtih lines and to monitor BP, light steadying assist for safety, further distance deferred today due to orthostatic hypotension (10/09/18 1115),      Stairs          Neuromuscular Re-education       Balance  Balance  Sitting - Static: Modified Independent (10/09/18 1115)  Sitting - Dynamic: Supervision (Supv) (10/09/18 1115)  Standing - Static: Supervision (Supv) (10/09/18 1115)  Standing - Dynamic (eyes open): Minimal Assist (Min) (10/09/18 1115)  Balance Comments #1: supervision initially at EOB due to hypotension but pt does not require any physical assistance once positioned at EOB (10/09/18 1115)  Balance Comments #2: WW for support in standing, pt able to complete 2 standing trials today with light steadying assist about 2-3 minutes each trial (10/09/18 1115)    Wheelchair Mobility       Patient's Personal Goal: return home (10/09/18 1115)    Therapy Goals:    Goals  Short Term Goals to Be Reviewed On: 10/16/18 (10/09/18 1115)  Short Term Goals = Discharge Goals: Yes (10/09/18 1115)  Goal Agreement: Patient agrees with goals and treatment plan (10/09/18 1115)  Bed Mobility Discharge Goal: supine to/from sit with modified independence  (10/09/18 1115)  Transfer Discharge Goal: sit to/from stand with modiifed independence (10/09/18 1115)  Ambulation Discharge Goal: at least 150' with device as needed and modified independence (10/09/18 1115)  Stairs Discharge Goal: 9 stairs with rail and modified independence (10/09/18 1115)        PT Time Spent: 55 minutes (10/09/18 1115)    See PT flowsheet for full details regarding the PT therapy provided.

## 2020-11-04 NOTE — ASSESSMENT & PLAN NOTE
Denies active chest pain.   - continue asa and statin  - outpatient follow up with cardiology; last seen Sept 2019 with Dr. Johns

## 2020-11-05 LAB
ALBUMIN SERPL BCP-MCNC: 3.4 G/DL (ref 3.2–4.9)
ALBUMIN/GLOB SERPL: 0.8 G/DL
ALP SERPL-CCNC: 92 U/L (ref 30–99)
ALT SERPL-CCNC: 20 U/L (ref 2–50)
ANION GAP SERPL CALC-SCNC: 13 MMOL/L (ref 7–16)
AST SERPL-CCNC: 28 U/L (ref 12–45)
BACTERIA UR CULT: NORMAL
BASOPHILS # BLD AUTO: 0.2 % (ref 0–1.8)
BASOPHILS # BLD: 0.04 K/UL (ref 0–0.12)
BILIRUB SERPL-MCNC: 0.3 MG/DL (ref 0.1–1.5)
BUN SERPL-MCNC: 12 MG/DL (ref 8–22)
CALCIUM SERPL-MCNC: 9.2 MG/DL (ref 8.5–10.5)
CHLORIDE SERPL-SCNC: 101 MMOL/L (ref 96–112)
CO2 SERPL-SCNC: 23 MMOL/L (ref 20–33)
CREAT SERPL-MCNC: 0.53 MG/DL (ref 0.5–1.4)
CRP SERPL HS-MCNC: 4.51 MG/DL (ref 0–0.75)
EOSINOPHIL # BLD AUTO: 0 K/UL (ref 0–0.51)
EOSINOPHIL NFR BLD: 0 % (ref 0–6.9)
ERYTHROCYTE [DISTWIDTH] IN BLOOD BY AUTOMATED COUNT: 40.8 FL (ref 35.9–50)
FERRITIN SERPL-MCNC: 318 NG/ML (ref 10–291)
GLOBULIN SER CALC-MCNC: 4.1 G/DL (ref 1.9–3.5)
GLUCOSE SERPL-MCNC: 151 MG/DL (ref 65–99)
HCT VFR BLD AUTO: 40.6 % (ref 37–47)
HGB BLD-MCNC: 13.8 G/DL (ref 12–16)
IMM GRANULOCYTES # BLD AUTO: 0.16 K/UL (ref 0–0.11)
IMM GRANULOCYTES NFR BLD AUTO: 0.8 % (ref 0–0.9)
LDH SERPL L TO P-CCNC: 280 U/L (ref 107–266)
LYMPHOCYTES # BLD AUTO: 1.61 K/UL (ref 1–4.8)
LYMPHOCYTES NFR BLD: 8.1 % (ref 22–41)
MAGNESIUM SERPL-MCNC: 2.1 MG/DL (ref 1.5–2.5)
MCH RBC QN AUTO: 28.4 PG (ref 27–33)
MCHC RBC AUTO-ENTMCNC: 34 G/DL (ref 33.6–35)
MCV RBC AUTO: 83.5 FL (ref 81.4–97.8)
MONOCYTES # BLD AUTO: 0.52 K/UL (ref 0–0.85)
MONOCYTES NFR BLD AUTO: 2.6 % (ref 0–13.4)
NEUTROPHILS # BLD AUTO: 17.46 K/UL (ref 2–7.15)
NEUTROPHILS NFR BLD: 88.3 % (ref 44–72)
NRBC # BLD AUTO: 0 K/UL
NRBC BLD-RTO: 0 /100 WBC
PLATELET # BLD AUTO: 276 K/UL (ref 164–446)
PMV BLD AUTO: 10.5 FL (ref 9–12.9)
POTASSIUM SERPL-SCNC: 3.6 MMOL/L (ref 3.6–5.5)
PROT SERPL-MCNC: 7.5 G/DL (ref 6–8.2)
RBC # BLD AUTO: 4.86 M/UL (ref 4.2–5.4)
SARS-COV-2 RNA RESP QL NAA+PROBE: DETECTED
SIGNIFICANT IND 70042: NORMAL
SITE SITE: NORMAL
SODIUM SERPL-SCNC: 137 MMOL/L (ref 135–145)
SOURCE SOURCE: NORMAL
SPECIMEN SOURCE: ABNORMAL
WBC # BLD AUTO: 19.8 K/UL (ref 4.8–10.8)

## 2020-11-05 PROCEDURE — 700102 HCHG RX REV CODE 250 W/ 637 OVERRIDE(OP): Performed by: HOSPITALIST

## 2020-11-05 PROCEDURE — 700102 HCHG RX REV CODE 250 W/ 637 OVERRIDE(OP): Performed by: STUDENT IN AN ORGANIZED HEALTH CARE EDUCATION/TRAINING PROGRAM

## 2020-11-05 PROCEDURE — 83735 ASSAY OF MAGNESIUM: CPT

## 2020-11-05 PROCEDURE — 770020 HCHG ROOM/CARE - TELE (206)

## 2020-11-05 PROCEDURE — A9270 NON-COVERED ITEM OR SERVICE: HCPCS | Performed by: STUDENT IN AN ORGANIZED HEALTH CARE EDUCATION/TRAINING PROGRAM

## 2020-11-05 PROCEDURE — A9270 NON-COVERED ITEM OR SERVICE: HCPCS | Performed by: HOSPITALIST

## 2020-11-05 PROCEDURE — 36415 COLL VENOUS BLD VENIPUNCTURE: CPT

## 2020-11-05 PROCEDURE — 82728 ASSAY OF FERRITIN: CPT

## 2020-11-05 PROCEDURE — 83615 LACTATE (LD) (LDH) ENZYME: CPT

## 2020-11-05 PROCEDURE — 85025 COMPLETE CBC W/AUTO DIFF WBC: CPT

## 2020-11-05 PROCEDURE — 86140 C-REACTIVE PROTEIN: CPT

## 2020-11-05 PROCEDURE — 700111 HCHG RX REV CODE 636 W/ 250 OVERRIDE (IP): Performed by: HOSPITALIST

## 2020-11-05 PROCEDURE — 80053 COMPREHEN METABOLIC PANEL: CPT

## 2020-11-05 PROCEDURE — 99232 SBSQ HOSP IP/OBS MODERATE 35: CPT | Performed by: STUDENT IN AN ORGANIZED HEALTH CARE EDUCATION/TRAINING PROGRAM

## 2020-11-05 RX ORDER — DOCUSATE SODIUM 100 MG/1
100 CAPSULE, LIQUID FILLED ORAL 2 TIMES DAILY
Status: DISCONTINUED | OUTPATIENT
Start: 2020-11-05 | End: 2020-11-07 | Stop reason: HOSPADM

## 2020-11-05 RX ADMIN — CARBOXYMETHYLCELLULOSE SODIUM 1 DROP: 5 SOLUTION/ DROPS OPHTHALMIC at 17:49

## 2020-11-05 RX ADMIN — DOCUSATE SODIUM 100 MG: 100 CAPSULE, LIQUID FILLED ORAL at 23:47

## 2020-11-05 RX ADMIN — ATORVASTATIN CALCIUM 80 MG: 80 TABLET, FILM COATED ORAL at 06:09

## 2020-11-05 RX ADMIN — ENOXAPARIN SODIUM 40 MG: 40 INJECTION SUBCUTANEOUS at 06:09

## 2020-11-05 RX ADMIN — GUAIFENESIN 600 MG: 600 TABLET, EXTENDED RELEASE ORAL at 22:20

## 2020-11-05 RX ADMIN — ASPIRIN 81 MG: 81 TABLET, COATED ORAL at 06:09

## 2020-11-05 RX ADMIN — DEXAMETHASONE 6 MG: 4 TABLET ORAL at 06:23

## 2020-11-05 RX ADMIN — CARBOXYMETHYLCELLULOSE SODIUM 1 DROP: 5 SOLUTION/ DROPS OPHTHALMIC at 06:09

## 2020-11-05 RX ADMIN — OXYCODONE HYDROCHLORIDE AND ACETAMINOPHEN 1000 MG: 500 TABLET ORAL at 06:09

## 2020-11-05 ASSESSMENT — ENCOUNTER SYMPTOMS
SENSORY CHANGE: 0
EYE REDNESS: 0
FALLS: 0
DEPRESSION: 0
VOMITING: 0
HEARTBURN: 0
FEVER: 0
ABDOMINAL PAIN: 0
BACK PAIN: 0
EYE DISCHARGE: 0
CHILLS: 0
INSOMNIA: 0
DIZZINESS: 0
WHEEZING: 0
WEIGHT LOSS: 0
FOCAL WEAKNESS: 0
PALPITATIONS: 0
CONSTIPATION: 0
NERVOUS/ANXIOUS: 0
TINGLING: 0
DIARRHEA: 0
HEADACHES: 0
COUGH: 1
SHORTNESS OF BREATH: 0
SORE THROAT: 0
NAUSEA: 0
HALLUCINATIONS: 0

## 2020-11-05 ASSESSMENT — LIFESTYLE VARIABLES: SUBSTANCE_ABUSE: 0

## 2020-11-05 NOTE — PROGRESS NOTES
Received report and assumed care of patient. Patient is alert and oriented x4. Patient is in no signs of distress denies pain at this time. Oxygen turned down to 1L will reassess. Patient was updated on the plan of care for the day. Call light within reach, bed in low position, 2 side rails up. All fall precautions in place. Will continue to monitor.

## 2020-11-05 NOTE — DISCHARGE PLANNING
Hospital Care Management Team Assessment     In the case of an emergency, pt's NOK is Dali Shaffer (Daughter) at [phone].     This RNCM spoke with patient via phone, with Caroline Beltran as the . Verified the accuracy of the facesheet, and obtained the information used in this assessment.      Pt lives in a single story [place] in [place], [place]. Prior to current hospitalization, pt was completely independent with ADLS/IADLS. Pt denies use of DME. Pt drives herself to and from her appointments and destinations. Pt is currently employed full-time at the [employer] in [place]. Pt has prescription coverage, and uses the [facility] [address] in [place]. Pt's support system includes her daughters. Pt's discharge plan is to return home once medically cleared.    Information Source  Orientation : Oriented x 4  Information Given By: Patient  Informant's Name: Katelynn Shaffer  Who is responsible for making decisions for patient? : Patient         Elopement Risk  Legal Hold: No  Ambulatory or Self Mobile in Wheelchair: Yes  Disoriented: No  Psychiatric Symptoms: None  History of Wandering: No  Elopement this Admit: No  Vocalizing Wanting to Leave: No  Displays Behaviors, Body Language Wanting to Leave: No-Not at Risk for Elopement  Elopement Risk: Not at Risk for Elopement    Interdisciplinary Discharge Planning  Does Admitting Nurse Feel This Could be a Complex Discharge?: No  Primary Care Physician: [facility]  Lives with - Patient's Self Care Capacity: Adult Children  Patient or legal guardian wants to designate a caregiver: No  Support Systems: Children, Family Member(s)  Housing / Facility: 1 Story House  Do You Take your Prescribed Medications Regularly: Yes  Able to Return to Previous ADL's: Future Time w/Therapy  Mobility Issues: No  Prior Services: None, Home-Independent  Patient Prefers to be Discharged to:: Home  Assistance Needed: Unknown at this Time  Durable Medical Equipment: Not  Applicable    Discharge Preparedness  What is your plan after discharge?: Home with help  What are your discharge supports?: Child  Prior Functional Level: Ambulatory, Drives Self, Independent with Activities of Daily Living, Independent with Medication Management  Difficulity with ADLs: None  Difficulity with IADLs: None    Functional Assesment  Prior Functional Level: Ambulatory, Drives Self, Independent with Activities of Daily Living, Independent with Medication Management    Finances  Financial Barriers to Discharge: No  Prescription Coverage: Yes    Vision / Hearing Impairment  Vision Impairment : Yes  Right Eye Vision: Impaired, Wears Glasses  Left Eye Vision: Impaired, Wears Glasses  Hearing Impairment : No    Values / Beliefs / Concerns  Values / Beliefs Concerns : No    Advance Directive  Advance Directive?: None  Advance Directive offered?: AD Booklet refused    Domestic Abuse  Have you ever been the victim of abuse or violence?: No  Physical Abuse or Sexual Abuse: No  Verbal Abuse or Emotional Abuse: No  Possible Abuse/Neglect Reported to:: Not Applicable    Psychological Assessment  History of Substance Abuse: None  History of Psychiatric Problems: No  Non-compliant with Treatment: No  Newly Diagnosed Illness: No    Discharge Risks or Barriers  Discharge risks or barriers?: Complex medical needs, Post-acute placement / services, Transportation  Patient risk factors: Vulnerable adult, Readmission, Complex medical needs    Anticipated Discharge Information  Discharge Disposition: Still a Patient (30)

## 2020-11-06 LAB
ALBUMIN SERPL BCP-MCNC: 3.2 G/DL (ref 3.2–4.9)
ALBUMIN/GLOB SERPL: 0.8 G/DL
ALP SERPL-CCNC: 93 U/L (ref 30–99)
ALT SERPL-CCNC: 26 U/L (ref 2–50)
ANION GAP SERPL CALC-SCNC: 16 MMOL/L (ref 7–16)
AST SERPL-CCNC: 29 U/L (ref 12–45)
BASOPHILS # BLD AUTO: 0.2 % (ref 0–1.8)
BASOPHILS # BLD: 0.04 K/UL (ref 0–0.12)
BILIRUB SERPL-MCNC: 0.4 MG/DL (ref 0.1–1.5)
BUN SERPL-MCNC: 12 MG/DL (ref 8–22)
CALCIUM SERPL-MCNC: 8.8 MG/DL (ref 8.5–10.5)
CHLORIDE SERPL-SCNC: 100 MMOL/L (ref 96–112)
CO2 SERPL-SCNC: 23 MMOL/L (ref 20–33)
CREAT SERPL-MCNC: 0.46 MG/DL (ref 0.5–1.4)
EOSINOPHIL # BLD AUTO: 0 K/UL (ref 0–0.51)
EOSINOPHIL NFR BLD: 0 % (ref 0–6.9)
ERYTHROCYTE [DISTWIDTH] IN BLOOD BY AUTOMATED COUNT: 41.1 FL (ref 35.9–50)
GLOBULIN SER CALC-MCNC: 4.1 G/DL (ref 1.9–3.5)
GLUCOSE SERPL-MCNC: 121 MG/DL (ref 65–99)
HCT VFR BLD AUTO: 39.9 % (ref 37–47)
HGB BLD-MCNC: 13.3 G/DL (ref 12–16)
IL6 SERPL-MCNC: 20.1 PG/ML
IMM GRANULOCYTES # BLD AUTO: 0.12 K/UL (ref 0–0.11)
IMM GRANULOCYTES NFR BLD AUTO: 0.7 % (ref 0–0.9)
LYMPHOCYTES # BLD AUTO: 1.27 K/UL (ref 1–4.8)
LYMPHOCYTES NFR BLD: 7 % (ref 22–41)
MCH RBC QN AUTO: 28 PG (ref 27–33)
MCHC RBC AUTO-ENTMCNC: 33.3 G/DL (ref 33.6–35)
MCV RBC AUTO: 84 FL (ref 81.4–97.8)
MONOCYTES # BLD AUTO: 0.53 K/UL (ref 0–0.85)
MONOCYTES NFR BLD AUTO: 2.9 % (ref 0–13.4)
NEUTROPHILS # BLD AUTO: 16.31 K/UL (ref 2–7.15)
NEUTROPHILS NFR BLD: 89.2 % (ref 44–72)
NRBC # BLD AUTO: 0 K/UL
NRBC BLD-RTO: 0 /100 WBC
PLATELET # BLD AUTO: 316 K/UL (ref 164–446)
PMV BLD AUTO: 10.1 FL (ref 9–12.9)
POTASSIUM SERPL-SCNC: 3.6 MMOL/L (ref 3.6–5.5)
PROT SERPL-MCNC: 7.3 G/DL (ref 6–8.2)
RBC # BLD AUTO: 4.75 M/UL (ref 4.2–5.4)
SODIUM SERPL-SCNC: 139 MMOL/L (ref 135–145)
WBC # BLD AUTO: 18.3 K/UL (ref 4.8–10.8)

## 2020-11-06 PROCEDURE — 80053 COMPREHEN METABOLIC PANEL: CPT

## 2020-11-06 PROCEDURE — 700102 HCHG RX REV CODE 250 W/ 637 OVERRIDE(OP): Performed by: HOSPITALIST

## 2020-11-06 PROCEDURE — 770020 HCHG ROOM/CARE - TELE (206)

## 2020-11-06 PROCEDURE — 36415 COLL VENOUS BLD VENIPUNCTURE: CPT

## 2020-11-06 PROCEDURE — 700102 HCHG RX REV CODE 250 W/ 637 OVERRIDE(OP): Performed by: STUDENT IN AN ORGANIZED HEALTH CARE EDUCATION/TRAINING PROGRAM

## 2020-11-06 PROCEDURE — A9270 NON-COVERED ITEM OR SERVICE: HCPCS | Performed by: STUDENT IN AN ORGANIZED HEALTH CARE EDUCATION/TRAINING PROGRAM

## 2020-11-06 PROCEDURE — 700111 HCHG RX REV CODE 636 W/ 250 OVERRIDE (IP): Performed by: HOSPITALIST

## 2020-11-06 PROCEDURE — 85025 COMPLETE CBC W/AUTO DIFF WBC: CPT

## 2020-11-06 PROCEDURE — 99232 SBSQ HOSP IP/OBS MODERATE 35: CPT | Performed by: STUDENT IN AN ORGANIZED HEALTH CARE EDUCATION/TRAINING PROGRAM

## 2020-11-06 PROCEDURE — A9270 NON-COVERED ITEM OR SERVICE: HCPCS | Performed by: HOSPITALIST

## 2020-11-06 RX ADMIN — ENOXAPARIN SODIUM 40 MG: 40 INJECTION SUBCUTANEOUS at 06:33

## 2020-11-06 RX ADMIN — OXYCODONE HYDROCHLORIDE AND ACETAMINOPHEN 1000 MG: 500 TABLET ORAL at 06:33

## 2020-11-06 RX ADMIN — GUAIFENESIN 600 MG: 600 TABLET, EXTENDED RELEASE ORAL at 21:02

## 2020-11-06 RX ADMIN — DOCUSATE SODIUM 100 MG: 100 CAPSULE, LIQUID FILLED ORAL at 06:33

## 2020-11-06 RX ADMIN — DEXAMETHASONE 6 MG: 4 TABLET ORAL at 06:33

## 2020-11-06 RX ADMIN — CARBOXYMETHYLCELLULOSE SODIUM 1 DROP: 5 SOLUTION/ DROPS OPHTHALMIC at 13:55

## 2020-11-06 RX ADMIN — ASPIRIN 81 MG: 81 TABLET, COATED ORAL at 06:34

## 2020-11-06 RX ADMIN — CARBOXYMETHYLCELLULOSE SODIUM 1 DROP: 5 SOLUTION/ DROPS OPHTHALMIC at 20:56

## 2020-11-06 RX ADMIN — ATORVASTATIN CALCIUM 80 MG: 80 TABLET, FILM COATED ORAL at 06:00

## 2020-11-06 RX ADMIN — DOCUSATE SODIUM 100 MG: 100 CAPSULE, LIQUID FILLED ORAL at 17:13

## 2020-11-06 ASSESSMENT — ENCOUNTER SYMPTOMS
DEPRESSION: 0
NERVOUS/ANXIOUS: 0
DIZZINESS: 0
CONSTIPATION: 0
EYE REDNESS: 0
FALLS: 0
SENSORY CHANGE: 0
WEIGHT LOSS: 0
TINGLING: 0
HALLUCINATIONS: 0
ABDOMINAL PAIN: 0
NAUSEA: 0
HEARTBURN: 0
BACK PAIN: 0
SORE THROAT: 0
CHILLS: 0
EYE DISCHARGE: 0
VOMITING: 0
DIARRHEA: 0
COUGH: 1
HEADACHES: 0
FEVER: 0
SHORTNESS OF BREATH: 0
INSOMNIA: 0
FOCAL WEAKNESS: 0
WHEEZING: 0
PALPITATIONS: 0

## 2020-11-06 ASSESSMENT — FIBROSIS 4 INDEX: FIB4 SCORE: 1.13

## 2020-11-06 ASSESSMENT — LIFESTYLE VARIABLES: SUBSTANCE_ABUSE: 0

## 2020-11-06 NOTE — PROGRESS NOTES
[facility] Medicine Daily Progress Note    Date of Service  11/6/2020    Chief Complaint  65 y.o. female admitted 11/3/2020 with cough fever and chest pain for 1 week    Hospital Course  65 y.o. female who presented 11/3/2020 with generalized malaise, fever and chills x1 week.   In the ED, she was found to be COVID positive, afebrile, increased RR to 34 and saturating 87% on RA. She was given albuterol, zofran, and decadron in the ED. D dimer and CRP elevated. Procal negative.   She was admitted; O2 sat 96 on 4 L nasal cannula, mild cough when taking deep breath.  Decadron 60 mg daily.  Heart rate was found to be in the 40s; decrease metoprolol from 25 mg to 12.5.       Interval Problem Update  SPO2 93 on 2 L nasal cannula  Mild cough, denies pain or shortness of breath;    have more shortness of breath when walking to the bathroom    Consultants/Specialty  None    Code Status  Full Code    Disposition  Likely home    Review of Systems  Review of Systems   Constitutional: Negative for chills, fever, malaise/fatigue and weight loss.   HENT: Negative for congestion and sore throat.    Eyes: Negative for discharge and redness.   Respiratory: Positive for cough (mild). Negative for shortness of breath and wheezing.    Cardiovascular: Negative for chest pain, palpitations and leg swelling.   Gastrointestinal: Negative for abdominal pain, constipation, diarrhea, heartburn, nausea and vomiting.   Genitourinary: Negative for dysuria, frequency and urgency.   Musculoskeletal: Negative for back pain, falls and joint pain.   Skin: Negative for itching and rash.   Neurological: Negative for dizziness, tingling, sensory change, focal weakness and headaches.   Psychiatric/Behavioral: Negative for depression, hallucinations and substance abuse. The patient is not nervous/anxious and does not have insomnia.    All other systems reviewed and are negative.       Physical Exam  Temp:  [36.1 °C (96.9 °F)-36.7 °C (98.1 °F)] 36.5 °C (97.7  °F)  Pulse:  [51-73] 59  Resp:  [18-22] 18  BP: (103-139)/(61-83) 103/61  SpO2:  [91 %-97 %] 93 %    Physical Exam  Vitals signs reviewed.   Constitutional:       General: She is not in acute distress.     Appearance: Normal appearance. She is normal weight. She is not diaphoretic.   HENT:      Head: Normocephalic and atraumatic.      Right Ear: External ear normal.      Left Ear: External ear normal.      Nose: Nose normal.      Mouth/Throat:      Pharynx: Oropharynx is clear.   Eyes:      General:         Right eye: No discharge.         Left eye: No discharge.      Extraocular Movements: Extraocular movements intact.      Conjunctiva/sclera: Conjunctivae normal.      Pupils: Pupils are equal, round, and reactive to light.   Neck:      Musculoskeletal: Normal range of motion. No neck rigidity or muscular tenderness.   Cardiovascular:      Rate and Rhythm: Normal rate and regular rhythm.      Pulses: Normal pulses.      Heart sounds: Normal heart sounds.   Pulmonary:      Effort: Pulmonary effort is normal. No respiratory distress.      Breath sounds: Normal breath sounds. No wheezing.      Comments: Mild crackles bilateral lower lobe  Abdominal:      General: Abdomen is flat. Bowel sounds are normal. There is no distension.      Palpations: Abdomen is soft.      Tenderness: There is no abdominal tenderness. There is no right CVA tenderness, left CVA tenderness, guarding or rebound.   Musculoskeletal: Normal range of motion.         General: No swelling, tenderness or deformity.      Right lower leg: No edema.      Left lower leg: No edema.   Skin:     General: Skin is warm and dry.   Neurological:      General: No focal deficit present.      Mental Status: She is alert and oriented to person, place, and time. Mental status is at baseline.      Cranial Nerves: No cranial nerve deficit.      Sensory: No sensory deficit.      Gait: Gait normal.      Deep Tendon Reflexes: Reflexes normal.   Psychiatric:         Mood  and Affect: Mood normal.         Behavior: Behavior normal.         Judgment: Judgment normal.         Fluids    Intake/Output Summary (Last 24 hours) at 11/6/2020 1519  Last data filed at 11/5/2020 1600  Gross per 24 hour   Intake --   Output 300 ml   Net -300 ml       Laboratory  Recent Labs     11/04/20  0706 11/05/20  0520 11/06/20  0509   WBC 4.5* 19.8* 18.3*   RBC 4.99 4.86 4.75   HEMOGLOBIN 14.3 13.8 13.3   HEMATOCRIT 42.4 40.6 39.9   MCV 85.0 83.5 84.0   MCH 28.7 28.4 28.0   MCHC 33.7 34.0 33.3*   RDW 41.2 40.8 41.1   PLATELETCT 229 276 316   MPV 10.2 10.5 10.1     Recent Labs     11/04/20  0706 11/05/20  0520 11/06/20  0509   SODIUM 135 137 139   POTASSIUM 4.0 3.6 3.6   CHLORIDE 99 101 100   CO2 26 23 23   GLUCOSE 168* 151* 121*   BUN 11 12 12   CREATININE 0.52 0.53 0.46*   CALCIUM 8.7 9.2 8.8                   Imaging  No orders to display        Assessment/Plan  * Acute hypoxemic respiratory failure due to COVID-19 (HCC)- (present on admission)  Assessment & Plan  Ferritin and LDH elevated.  96% on 2 L nasal cannula  - RT protocol  - monitor inflammatory markers q48 hours  - supplemental O2 as needed  - continue with decadron 6 mg daily  - discussed the importance of prone positioning  - procal negative therefore no indication for antibiotics at this time  - supportive care: anti-emetics, cough syrup  -Under droplet contact isolation    Coronary artery disease: MIKI to LAD ×2 and 6/2016- (present on admission)  Assessment & Plan  Denies active chest pain.   - continue asa and statin  - outpatient follow up with cardiology; last seen Sept 2019 with Dr. Johns    Hyponatremia- (present on admission)  Assessment & Plan  resolved    Hypokalemia- (present on admission)  Assessment & Plan  resolved  - magnesium level normal    Essential hypertension- (present on admission)  Assessment & Plan  Normotensive.   -Lower metroprolol XL to 12.5mg daily for bradycardia  - monitor    Obesity- (present on  admission)  Assessment & Plan  Body mass index is 36.04 kg/m².       VTE prophylaxis: lovenox

## 2020-11-06 NOTE — CARE PLAN
Problem: Safety  Goal: Will remain free from injury  Note: Non slip socks on, bed in low position, 2 side rails up, call light within reach, educated on fall risk, verbalizes understanding, will continue to monitor.      Problem: Knowledge Deficit  Goal: Knowledge of disease process/condition, treatment plan, diagnostic tests, and medications will improve  Note: Patient updated on plan of care for the day, patient verbalized understanding. All questions and concerns addressed at this time.

## 2020-11-06 NOTE — PROGRESS NOTES
Bedside report received and care assumed at start of shift.    Pt is A&Ox4 and on 2 L of O2. No reports of pain. VS stable. Tele monitor in place.    Pt is resting in bed. Call light is in reach and bed is locked in its lowest position. Hourly rounding in place. Assessment complete and all needs are attended to.

## 2020-11-07 ENCOUNTER — APPOINTMENT (OUTPATIENT)
Dept: RADIOLOGY | Facility: MEDICAL CENTER | Age: 65
DRG: 177 | End: 2020-11-07
Attending: STUDENT IN AN ORGANIZED HEALTH CARE EDUCATION/TRAINING PROGRAM
Payer: COMMERCIAL

## 2020-11-07 ENCOUNTER — PHARMACY VISIT (OUTPATIENT)
Dept: PHARMACY | Facility: MEDICAL CENTER | Age: 65
End: 2020-11-07
Payer: COMMERCIAL

## 2020-11-07 VITALS
SYSTOLIC BLOOD PRESSURE: 129 MMHG | TEMPERATURE: 97 F | BODY MASS INDEX: 31.73 KG/M2 | RESPIRATION RATE: 16 BRPM | HEART RATE: 56 BPM | WEIGHT: 137.13 LBS | DIASTOLIC BLOOD PRESSURE: 67 MMHG | OXYGEN SATURATION: 96 % | HEIGHT: 55 IN

## 2020-11-07 LAB
ALBUMIN SERPL BCP-MCNC: 3.2 G/DL (ref 3.2–4.9)
ALBUMIN/GLOB SERPL: 0.8 G/DL
ALP SERPL-CCNC: 99 U/L (ref 30–99)
ALT SERPL-CCNC: 40 U/L (ref 2–50)
ANION GAP SERPL CALC-SCNC: 14 MMOL/L (ref 7–16)
AST SERPL-CCNC: 49 U/L (ref 12–45)
BASOPHILS # BLD AUTO: 0.1 % (ref 0–1.8)
BASOPHILS # BLD: 0.01 K/UL (ref 0–0.12)
BILIRUB SERPL-MCNC: 0.4 MG/DL (ref 0.1–1.5)
BUN SERPL-MCNC: 14 MG/DL (ref 8–22)
CALCIUM SERPL-MCNC: 9.1 MG/DL (ref 8.5–10.5)
CHLORIDE SERPL-SCNC: 99 MMOL/L (ref 96–112)
CO2 SERPL-SCNC: 25 MMOL/L (ref 20–33)
CREAT SERPL-MCNC: 0.38 MG/DL (ref 0.5–1.4)
CRP SERPL HS-MCNC: 2.67 MG/DL (ref 0–0.75)
D DIMER PPP IA.FEU-MCNC: 0.49 UG/ML (FEU) (ref 0–0.5)
EOSINOPHIL # BLD AUTO: 0 K/UL (ref 0–0.51)
EOSINOPHIL NFR BLD: 0 % (ref 0–6.9)
ERYTHROCYTE [DISTWIDTH] IN BLOOD BY AUTOMATED COUNT: 41.2 FL (ref 35.9–50)
FERRITIN SERPL-MCNC: 290 NG/ML (ref 10–291)
GLOBULIN SER CALC-MCNC: 4.1 G/DL (ref 1.9–3.5)
GLUCOSE SERPL-MCNC: 118 MG/DL (ref 65–99)
HCT VFR BLD AUTO: 40.3 % (ref 37–47)
HGB BLD-MCNC: 13.2 G/DL (ref 12–16)
IMM GRANULOCYTES # BLD AUTO: 0.08 K/UL (ref 0–0.11)
IMM GRANULOCYTES NFR BLD AUTO: 0.6 % (ref 0–0.9)
LDH SERPL L TO P-CCNC: 234 U/L (ref 107–266)
LYMPHOCYTES # BLD AUTO: 1.7 K/UL (ref 1–4.8)
LYMPHOCYTES NFR BLD: 13.7 % (ref 22–41)
MCH RBC QN AUTO: 27.8 PG (ref 27–33)
MCHC RBC AUTO-ENTMCNC: 32.8 G/DL (ref 33.6–35)
MCV RBC AUTO: 84.8 FL (ref 81.4–97.8)
MONOCYTES # BLD AUTO: 0.56 K/UL (ref 0–0.85)
MONOCYTES NFR BLD AUTO: 4.5 % (ref 0–13.4)
NEUTROPHILS # BLD AUTO: 10.05 K/UL (ref 2–7.15)
NEUTROPHILS NFR BLD: 81.1 % (ref 44–72)
NRBC # BLD AUTO: 0 K/UL
NRBC BLD-RTO: 0 /100 WBC
PLATELET # BLD AUTO: 333 K/UL (ref 164–446)
PMV BLD AUTO: 10.6 FL (ref 9–12.9)
POTASSIUM SERPL-SCNC: 3.9 MMOL/L (ref 3.6–5.5)
PROT SERPL-MCNC: 7.3 G/DL (ref 6–8.2)
RBC # BLD AUTO: 4.75 M/UL (ref 4.2–5.4)
SODIUM SERPL-SCNC: 138 MMOL/L (ref 135–145)
WBC # BLD AUTO: 12.4 K/UL (ref 4.8–10.8)

## 2020-11-07 PROCEDURE — RXMED WILLOW AMBULATORY MEDICATION CHARGE: Performed by: STUDENT IN AN ORGANIZED HEALTH CARE EDUCATION/TRAINING PROGRAM

## 2020-11-07 PROCEDURE — 82728 ASSAY OF FERRITIN: CPT

## 2020-11-07 PROCEDURE — 99239 HOSP IP/OBS DSCHRG MGMT >30: CPT | Performed by: STUDENT IN AN ORGANIZED HEALTH CARE EDUCATION/TRAINING PROGRAM

## 2020-11-07 PROCEDURE — A9270 NON-COVERED ITEM OR SERVICE: HCPCS | Performed by: HOSPITALIST

## 2020-11-07 PROCEDURE — A9270 NON-COVERED ITEM OR SERVICE: HCPCS | Performed by: STUDENT IN AN ORGANIZED HEALTH CARE EDUCATION/TRAINING PROGRAM

## 2020-11-07 PROCEDURE — 85025 COMPLETE CBC W/AUTO DIFF WBC: CPT

## 2020-11-07 PROCEDURE — 85379 FIBRIN DEGRADATION QUANT: CPT

## 2020-11-07 PROCEDURE — 83615 LACTATE (LD) (LDH) ENZYME: CPT

## 2020-11-07 PROCEDURE — 80053 COMPREHEN METABOLIC PANEL: CPT

## 2020-11-07 PROCEDURE — 700111 HCHG RX REV CODE 636 W/ 250 OVERRIDE (IP): Performed by: HOSPITALIST

## 2020-11-07 PROCEDURE — 71045 X-RAY EXAM CHEST 1 VIEW: CPT

## 2020-11-07 PROCEDURE — 86140 C-REACTIVE PROTEIN: CPT

## 2020-11-07 PROCEDURE — 700102 HCHG RX REV CODE 250 W/ 637 OVERRIDE(OP): Performed by: HOSPITALIST

## 2020-11-07 PROCEDURE — 36415 COLL VENOUS BLD VENIPUNCTURE: CPT

## 2020-11-07 PROCEDURE — 700102 HCHG RX REV CODE 250 W/ 637 OVERRIDE(OP): Performed by: STUDENT IN AN ORGANIZED HEALTH CARE EDUCATION/TRAINING PROGRAM

## 2020-11-07 RX ORDER — GUAIFENESIN 600 MG/1
600 TABLET, EXTENDED RELEASE ORAL 2 TIMES DAILY PRN
Qty: 28 TAB | Refills: 0 | Status: SHIPPED | OUTPATIENT
Start: 2020-11-07

## 2020-11-07 RX ORDER — AMLODIPINE BESYLATE 5 MG/1
5 TABLET ORAL DAILY
Qty: 30 TAB | Refills: 0 | Status: SHIPPED | OUTPATIENT
Start: 2020-11-08

## 2020-11-07 RX ORDER — AMLODIPINE BESYLATE 5 MG/1
5 TABLET ORAL
Status: DISCONTINUED | OUTPATIENT
Start: 2020-11-07 | End: 2020-11-07 | Stop reason: HOSPADM

## 2020-11-07 RX ADMIN — CARBOXYMETHYLCELLULOSE SODIUM 1 DROP: 5 SOLUTION/ DROPS OPHTHALMIC at 05:45

## 2020-11-07 RX ADMIN — ASPIRIN 81 MG: 81 TABLET, COATED ORAL at 05:43

## 2020-11-07 RX ADMIN — DOCUSATE SODIUM 100 MG: 100 CAPSULE, LIQUID FILLED ORAL at 05:43

## 2020-11-07 RX ADMIN — AMLODIPINE BESYLATE 5 MG: 5 TABLET ORAL at 08:25

## 2020-11-07 RX ADMIN — ENOXAPARIN SODIUM 40 MG: 40 INJECTION SUBCUTANEOUS at 05:42

## 2020-11-07 RX ADMIN — OXYCODONE HYDROCHLORIDE AND ACETAMINOPHEN 1000 MG: 500 TABLET ORAL at 05:44

## 2020-11-07 RX ADMIN — ATORVASTATIN CALCIUM 80 MG: 80 TABLET, FILM COATED ORAL at 05:45

## 2020-11-07 RX ADMIN — CARBOXYMETHYLCELLULOSE SODIUM 1 DROP: 5 SOLUTION/ DROPS OPHTHALMIC at 14:10

## 2020-11-07 RX ADMIN — DEXAMETHASONE 6 MG: 4 TABLET ORAL at 05:45

## 2020-11-07 RX ADMIN — DOCUSATE SODIUM 100 MG: 100 CAPSULE, LIQUID FILLED ORAL at 18:10

## 2020-11-07 NOTE — CARE PLAN
Problem: Respiratory:  Goal: Respiratory status will improve  Outcome: PROGRESSING AS EXPECTED   Patient respiratory status assessed. Patient educated on importance of deep breathing. Deep breaths are encouraged. Educated on how ambulation and movement can affect respiratory status. Patient indicates understanding.     Problem: Safety  Goal: Will remain free from injury  Outcome: PROGRESSING AS EXPECTED   Patient's risk for injury and falls assessed. Appropriate safety precautions in place. Patient educated to utilize call light for needs. Patient verbalizes understanding.

## 2020-11-07 NOTE — DISCHARGE SUMMARY
Discharge Summary    CHIEF COMPLAINT ON ADMISSION  Chief Complaint   Patient presents with   • Cough     x 1 week. sent by MD for covid symptoms. had xray done in urgent care w/c shows pneumonia and suspected for covid 19   • Fever   • Chest Pain     x 1 week. states worse during coughing.       Reason for Admission  Cough; SOB     Admission Date  11/3/2020    CODE STATUS  Full Code    HPI & HOSPITAL COURSE  65 y.o. female who presented 11/3/2020 with generalized malaise, fever and chills x1 week.   In the ED, she was found to be COVID positive, afebrile, increased RR to 34 and saturating 87% on RA. She was given albuterol, zofran, and decadron in the ED. D dimer and CRP elevated. Procal negative.   She was admitted; O2 sat 96 on 4 L nasal cannula, mild cough when taking deep breath.  Decadron 60 mg daily.   metoprolol was discontinued for bradycardia. Pt is improved after decadron. And will discharged home with home oxygen.      Therefore, she is discharged in good and stable condition to home with close outpatient follow-up.    The patient recovered much more quickly than anticipated on admission.    Discharge Date  11/7/2020    FOLLOW UP ITEMS POST DISCHARGE  Please self quarantine for another 5 days  Practice social distance  Follow-up with Primary care physician    DISCHARGE DIAGNOSES  Principal Problem:    Acute hypoxemic respiratory failure due to COVID-19 (HCC) POA: Yes  Active Problems:    Coronary artery disease: MIKI to LAD ×2 and 6/2016 POA: Yes    Essential hypertension POA: Yes    Hypokalemia POA: Yes    Hyponatremia POA: Yes    Obesity POA: Yes  Resolved Problems:    * No resolved hospital problems. *      FOLLOW UP  No future appointments.  No follow-up provider specified.    MEDICATIONS ON DISCHARGE     Medication List      START taking these medications      Instructions   amLODIPine 5 MG Tabs  Start taking on: November 8, 2020  Commonly known as: NORVASC   Take 1 Tab by mouth every day.  Dose: 5  mg     guaiFENesin  MG Tb12  Commonly known as: MUCINEX   Take 1 Tab by mouth 2 times a day as needed for Cough (productive cough).  Dose: 600 mg        CONTINUE taking these medications      Instructions   aspirin EC 81 MG Tbec  Commonly known as: ECOTRIN   Take 1 Tab by mouth every day.  Dose: 81 mg     atorvastatin 80 MG tablet  Commonly known as: LIPITOR   Take 1 Tab by mouth every day.  Dose: 80 mg     Vitamin C 1000 MG Tabs   Take 1,000 mg by mouth every day.  Dose: 1,000 mg        STOP taking these medications    Airborne Gummies Chew     metoprolol SR 25 MG Tb24  Commonly known as: TOPROL XL     nitroglycerin 0.4 MG Subl  Commonly known as: NITROSTAT            Allergies  No Known Allergies    DIET  Orders Placed This Encounter   Procedures   • Diet Order Diet: Regular     Standing Status:   Standing     Number of Occurrences:   1     Order Specific Question:   Diet:     Answer:   Regular [1]       ACTIVITY  As tolerated.  Weight bearing as tolerated    CONSULTATIONS  none    PROCEDURES  none    LABORATORY  Lab Results   Component Value Date    SODIUM 138 11/07/2020    POTASSIUM 3.9 11/07/2020    CHLORIDE 99 11/07/2020    CO2 25 11/07/2020    GLUCOSE 118 (H) 11/07/2020    BUN 14 11/07/2020    CREATININE 0.38 (L) 11/07/2020        Lab Results   Component Value Date    WBC 12.4 (H) 11/07/2020    HEMOGLOBIN 13.2 11/07/2020    HEMATOCRIT 40.3 11/07/2020    PLATELETCT 333 11/07/2020        Total time of the discharge process exceeds 40 minutes.

## 2020-11-07 NOTE — PROGRESS NOTES
Report received. Patient oriented x4. Pain level 0 out of 10. Mild sob with ambulation,productive cough. cough medication provided. Patient using Incentive spirometer. Fall risk interventions in place, bed in low position, and bed alarm on. Assessment completed.

## 2020-11-07 NOTE — FACE TO FACE
"Face to Face Note  -  Durable Medical Equipment    Terrance Hyde M.D. - NPI: [id number]  I certify that this patient is under my care and that they had a durable medical equipment(DME)face to face encounter by myself and the clinical nurse specialist working collaboratively with me that meets the physician DME face-to-face encounter requirements with this patient on:    Date of encounter:   Patient:                    MRN:                       YOB: 2018  Katelynn Hart  [id number]  1955     The encounter with the patient was in whole, or in part, for the following medical condition, which is the primary reason for durable medical equipment:  Other - COVID-19 infection    I certify that, based on my findings, the following durable medical equipment is medically necessary:  Oxygen.    HOME O2 Saturation Measurements:(Values must be present for Home Oxygen orders)        With liters of O2: 2, O2 sat at rest with O2: 96  With Liters of O2: 2.5, O2 sat with amb with O2 : 93  Is the patient mobile?: Yes    My Clinical findings support the need for the above equipment due to:  Hypoxia    Supporting Symptoms: The patient requires supplemental oxygen, as the following interventions have been tried with limited or no improvement: \"Oral and/or IV steroids and \"Incentive spirometry    "

## 2020-11-07 NOTE — CARE PLAN
Problem: Safety  Goal: Will remain free from injury  Outcome: PROGRESSING AS EXPECTED   Patient's risk for injury and falls assessed. Appropriate safety precautions in place. Patient educated to utilize call light for needs. Patient verbalizes understanding.     Problem: Venous Thromboembolism (VTW)/Deep Vein Thrombosis (DVT) Prevention:  Goal: Patient will participate in Venous Thrombosis (VTE)/Deep Vein Thrombosis (DVT)Prevention Measures  Outcome: PROGRESSING AS EXPECTED   Patient educated on DVT risks. SCDs are on. Patient VS are stable. No signs of DVT. Will continue to monitor for change status.

## 2020-11-07 NOTE — DISCHARGE PLANNING
Received Choice form at 1345  Agency/Facility Name: [facility]  Referral sent per Choice form at 1340     1425- 02 to be delivered to bedside today.r  155- Spoke To: Rocky  Agency/Facility Name: [facility]  Plan or Request: Rocky stated he is the  and he wont be able to get to bed side until approx 1900 tonight. Cell phone is [phone]

## 2020-11-07 NOTE — CARE PLAN
Problem: Venous Thromboembolism (VTW)/Deep Vein Thrombosis (DVT) Prevention:  Goal: Patient will participate in Venous Thrombosis (VTE)/Deep Vein Thrombosis (DVT)Prevention Measures  Flowsheets (Taken 11/6/2020 0601)  Pharmacologic Prophylaxis Used: LMWH: Enoxaparin(Lovenox)

## 2020-11-08 LAB
BACTERIA BLD CULT: NORMAL
BACTERIA BLD CULT: NORMAL
SIGNIFICANT IND 70042: NORMAL
SIGNIFICANT IND 70042: NORMAL
SITE SITE: NORMAL
SITE SITE: NORMAL
SOURCE SOURCE: NORMAL
SOURCE SOURCE: NORMAL

## 2020-11-08 NOTE — DISCHARGE INSTRUCTIONS
Please self quarantine for another 5 days  Practice social distance  Follow-up with Primary care physician    Discharge Instructions    Discharged to home by car with relative. Discharged via wheelchair, hospital escort: Yes.  Special equipment needed: Oxygen    Be sure to schedule a follow-up appointment with your primary care doctor or any specialists as instructed.     Discharge Plan:   Influenza Vaccine Indication: Not indicated: Previously immunized this influenza season and > 8 years of age    I understand that a diet low in cholesterol, fat, and sodium is recommended for good health. Unless I have been given specific instructions below for another diet, I accept this instruction as my diet prescription.   Other diet: regular    Special Instructions: None    · Is patient discharged on Warfarin / Coumadin?   No     Depression / Suicide Risk    As you are discharged from this [facility] facility, it is important to learn how to keep safe from harming yourself.    Recognize the warning signs:  · Abrupt changes in personality, positive or negative- including increase in energy   · Giving away possessions  · Change in eating patterns- significant weight changes-  positive or negative  · Change in sleeping patterns- unable to sleep or sleeping all the time   · Unwillingness or inability to communicate  · Depression  · Unusual sadness, discouragement and loneliness  · Talk of wanting to die  · Neglect of personal appearance   · Rebelliousness- reckless behavior  · Withdrawal from people/activities they love  · Confusion- inability to concentrate     If you or a loved one observes any of these behaviors or has concerns about self-harm, here's what you can do:  · Talk about it- your feelings and reasons for harming yourself  · Remove any means that you might use to hurt yourself (examples: pills, rope, extension cords, firearm)  · Get professional help from the community (Mental Health, Substance Abuse,  psychological counseling)  · Do not be alone:Call your Safe Contact- someone whom you trust who will be there for you.  · Call your local CRISIS HOTLINE 878-1803 or 591-068-4734  · Call your local Children's Mobile Crisis Response Team [place] (842) 263-7126 or www.uTest  · Call the toll free National Suicide Prevention Hotlines   · National Suicide Prevention Lifeline 416-124-FTYY (9849)  · [employer] Line Network 800-SUICIDE (911-3771)      COVID-19  COVID-19  La COVID-19 es dylan infección respiratoria causada por un virus llamado coronavirus tipo 2 causante del síndrome respiratorio didi grave (SARS-CoV-2). La enfermedad también se conoce bradly enfermedad por coronavirus o nuevo coronavirus. En algunas personas, el virus puede no ocasionar síntomas. En otras, puede producir dylan infección grave. La infección puede empeorar rápidamente y causar complicaciones, bradly:  · Neumonía o infección en los pulmones.  · Síndrome de dificultad respiratoria aguda o SDRA. Se trata de la acumulación de líquido en los pulmones.  · Insuficiencia respiratoria aguda. Se trata de dylan afección en la que no pasa suficiente oxígeno de los pulmones al cuerpo.  · Sepsis o choque séptico. Se trata de dylan reacción grave del cuerpo ante dylan infección.  · Problemas de coagulación.  · Infecciones secundarias debido a bacterias u hongos.  El virus que causa la COVID-19 es contagioso. [place] significa que puede transmitirse de dylan persona a otra a través de las gotitas de saliva de la tos y de los estornudos (secreciones respiratorias).  ¿Cuáles son las causas?  Esta enfermedad es causada por un virus. Usted puede contagiarse con waldo virus:  · Al aspirar las gotitas que dylan persona infectada elimina al toser o estornudar.  · Al tocar algo, bradly dylan [place] o el picaportes de dylan danni, que estuvo expuesto al virus (contaminado) y luego tocarse la boca, nariz o los ojos.  ¿Qué incrementa el riesgo?  Riesgo de infección  Es más  probable que se infecte con waldo virus si:  · Vive o viaja a dylan fiorella donde hay un brote de COVID-19.  · Entra en contacto con dylan persona enferma que recientemente viajó a dylan fiorella con un brote de COVID-19.  · Cuida o vive con dylan persona infectada con COVID-19.  Riesgo de enfermedad grave  Es más probable que se enferme gravemente por el virus si:  · Tiene 65 años o más.  · Tiene dylan enfermedad crónica que disminuye la capacidad del cuerpo para combatir las infecciones (immunocomprometido).  · Vive en un hogar de ancianos o centro de atención a james plazo.  · Tiene dylan enfermedad prolongada (crónica), bradly las siguientes:  ? Enfermedad pulmonar crónica, que incluye la enfermedad pulmonar obstructiva crónica o asma.  ? Enfermedad cardíaca.  ? Diabetes.  ? Enfermedad renal crónica.  ? Enfermedad hepática.  · Es reinier.  ¿Cuáles son los signos o síntomas?  Los síntomas de esta afección pueden ser de leves a graves. Los síntomas pueden aparecer en el término de 2 a 14 días después de tay estado expuesto al virus. Incluyen los siguientes:  · Fiebre.  · Tos.  · Dificultad para respirar.  · Escalofríos.  · Zara musculares.  · Dolor de garganta.  · Pérdida del gusto o el olfato.  Algunas personas también pueden tener problemas estomacales, bradly náuseas, vómitos o diarrea.  Es posible que otras personas no tengan síntomas de COVID-19.  ¿Cómo se diagnostica?  Esta afección se puede diagnosticar en función de lo siguiente:  · Sandra signos y síntomas, especialmente si:  ? Vive en dylan fiorella donde hay un brote de COVID-19.  ? Viajó recientemente a dylan fiorella donde el virus es frecuente.  ? Cuida o vive con dylan persona a quien se le diagnosticó COVID-19.  · Un examen físico.  · Análisis de laboratorio que pueden incluir:  ? Un hisopado nasal para luna dylan muestra de líquido de la nariz.  ? Un hisopado de garganta para luna dylan muestra de líquido de la garganta.  ? Dylan muestra de mucosidad de los pulmones (esputo).  ? Análisis  de edd.  · Los estudios de diagnóstico por imágenes pueden incluir radiografías, exploración por tomografía computarizada (TC) o ecografía.  ¿Cómo se trata?  En waldo momento, no hay ningún medicamento para tratar la COVID-19. Los medicamentos para tratar otras enfermedades se usan a modo de ensayo para comprobar si son eficaces contra la COVID-19.  El médico le informará sobre las maneras de tratar los síntomas. En la mayoría de las personas, la infección es leve y puede controlarse en el hogar con reposo, líquidos y medicamentos de venta den.  El tratamiento para dylan infección grave suele realizarse en la unidad de cuidados intensivos (UCI) de un hospital. Puede incluir justina o más de los siguientes. Estos tratamientos se administran hasta que los síntomas mejoran.  · Recibir líquidos y medicamentos a través de dylan vía intravenosa.  · Oxígeno complementario. Para administrar oxígeno extra, se utiliza un tubo en la nariz, dylan mascarilla o dylan lonnie de oxígeno.  · Colocarlo para que se recueste boca abajo (decúbito prono). [place] facilita el ingreso de oxígeno a los pulmones.  · Uso continuo de dylan máquina de presión positiva de las vías aéreas (CPAP) o de presión positiva de las vías aéreas de dos niveles (BPAP). Waldo tratamiento utiliza dylan presión de aire leve para mantener las vías respiratorias abiertas. Un tubo conectado a un motor administra oxígeno al cuerpo.  · Respirador. [place] tratamiento mueve el aire dentro y fuera de los pulmones mediante el uso de un tubo que se coloca en la tráquea.  · Traqueostomía. En waldo procedimiento se hace un orificio en el bonita para insertar un tubo de respiración.  · Oxigenación por membrana extracorpórea (OMEC). En waldo procedimiento, los pulmones tienen la posibilidad de recuperarse al asumir las funciones del corazón y los pulmones. Suministra oxígeno al cuerpo y elimina el dióxido de carbono.  Siga estas instrucciones en moreno casa:  Estilo de larry  · Si está enfermo,  quédese en moreno casa, excepto para obtener atención médica. El médico le indicará cuánto tiempo debe quedarse en casa. Llame al médico antes de buscar atención médica.  · Anahi reposo en moreno casa bradly se lo haya indicado el médico.  · No consuma ningún producto que contenga nicotina o tabaco, bradly cigarrillos, cigarrillos electrónicos y tabaco de mascar. Si necesita ayuda para dejar de fumar, consulte al médico.  · Retome noel actividades normales bradly se lo haya indicado el médico. Pregúntele al médico qué actividades son seguras para usted.  Instrucciones generales  · Use los medicamentos de venta den y los recetados solamente bradly se lo haya indicado el médico.  · Ban suficiente líquido bradly para mantener la orina de color amarillo pálido.  · Concurra a todas las visitas de seguimiento bradly se lo haya indicado el médico. [place] es importante.  ¿Cómo se curt?    No hay ninguna vacuna que ayude a prevenir la infección por la COVID-19. Sin embargo, hay medidas que puede luna para protegerse y proteger a otras personas de waldo virus.  Para protegerse:   · No viaje a zonas donde la COVID-19 sea un riesgo. Las zonas donde se informa la presencia de la COVID-19 cambian con frecuencia. Para identificar las zonas de alto riesgo y las restricciones de viaje, consulte el sitio web de viajes de los Centers for Disease Control and Prevention (CDC) (Centros para el Control y la Prevención de Enfermedades): wwwnc.cdc.gov/travel/notices  · Si vive o debe viajar a dylan fiorella donde COVID-19 es un riesgo, tome precauciones para evitar infecciones.  ? Aléjese de las personas enfermas.  ? Lávese las alee frecuentemente con agua y jabón wilma 20 segundos. Use desinfectante para alee con alcohol si no dispone de agua y jabón.  ? Evite tocarse la boca, la paola, los ojos o la nariz.  ? Evite salir de moreno casa, siga las indicaciones de moreno estado y de las autoridades sanitarias locales.  ? Si debe salir de moreno casa, use un barbijo de mikel o  dylan mascarilla facial.  ? Desinfecte los objetos y las superficies que se tocan con frecuencia todos los días. Pueden incluir:  § Encimeras y mesas.  § Picaportes e interruptores de andrew.  § Lavabos, fregaderos y grifos.  § Aparatos electrónicos tales bradly teléfonos, controles remotos, teclados, computadoras y tabletas.  Cómo proteger a los demás:  Si tiene síntomas de la COVID-19, tome medidas para evitar que el virus se propague a otras personas.  · Si susan que tiene dylan infección por la COVID-19, comuníquese de inmediato con moreno médico. Informe al equipo de atención médica que susan que puede tener dylan infección por la COVID-19.  · Quédese en moreno casa. Salga de moreno casa solo para buscar atención médica. No utilice el transporte público.  · No viaje mientras esté enfermo.  · Lávese las alee frecuentemente con agua y jabón wilma 20 segundos. Usar desinfectante para alee con alcohol si no dispone de agua y jabón.  · Manténgase alejado de quienes vivan con usted. Permita que los miembros de la wesly sanos cuiden a los niños y las mascotas, si es posible. Si tiene que cuidar a los niños o las mascotas, lávese las alee con frecuencia y use un barbijo. Si es posible, permanezca en moreno habitación, separado de los demás. Utilice un baño diferente.  · Asegúrese de que todas las personas que viven en moreno casa se laven donovan las alee y con frecuencia.  · Tosa o estornude en un pañuelo de papel o sobre moreno manga o codo. No tosa o estornude al aire ni se cubra la boca o la nariz con la mano.  · Use un barbijo de mikel o dylan mascarilla facial.  Dónde buscar más información  · Centers for Disease Control and Prevention (Centros para el Control y la Prevención de Enfermedades): www.cdc.gov/coronavirus/2019-ncov/index.html  · World Health Organization (Organización Mundial de la [place]): www.who.int/health-topics/coronavirus  Comuníquese con un médico si:  · Vive o ha viajado a dylan fiorella donde la COVID-19 es un riesgo y tiene síntomas  de infección.  · Ha tenido contacto con alguien que tiene COVID-19 y usted tiene síntomas de infección.  Solicite ayuda de inmediato si:  · Tiene dificultad para respirar.  · Siente dolor u opresión en el pecho.  · Experimenta confusión.  · Tiene las uñas de los dedos y los labios de color azulado.  · Tiene dificultad para despertarse.  · Los síntomas empeoran.  Estos síntomas pueden representar un problema grave que constituye dylan emergencia. No espere a beto si los síntomas desaparecen. Solicite atención médica de inmediato. Comuníquese con el servicio de emergencias de moreno localidad (911 en los [place]). No conduzca por noel propios medios hasta el hospital. Informe al personal médico de emergencias si susan que tiene COVID-19.  Resumen  · La COVID-19 es dylan infección respiratoria causada por un virus. También se conoce bradly enfermedad por coronavirus o nuevo coronavirus. Puede causar infecciones graves, bradly neumonía, síndrome de dificultad respiratoria aguda, insuficiencia respiratoria aguda o sepsis.  · El virus que causa la COVID-19 es contagioso. [place] significa que puede transmitirse de dylan persona a otra a través de las gotitas de saliva de la tos y de los estornudos.  · Es más probable que desarrolle dylan enfermedad grave si tiene 65 años o más, tiene un sistema inmunitario débil, vive en un hogar de ancianos o tiene enfermedad crónica.  · No hay ningún medicamento para tratar la COVID-19. El médico le informará sobre las maneras de tratar los síntomas.  · [place] medidas para protegerse y proteger a los demás contra las infecciones. Lávese las alee con frecuencia y desinfecte los objetos y las superficies que se tocan con frecuencia todos los días. Manténgase alejado de las personas que estén enfermas y use un barbijo si está enfermo.  Esta información no tiene bradly fin reemplazar el consejo del médico. Asegúrese de hacerle al médico cualquier pregunta que tenga.  Document Released: 02/15/2020 Document  Revised: 05/19/2020 Document Reviewed: 02/15/2020  Elsevier Patient Education © 2020 Elsevier Inc.    INSTRUCCIONES PARA LA COVID-19 O CUALQUIER OTRA  ENFERMEDAD RESPIRATORIA INFECCIOSA    Los Centros para el Control y la Prevención de Enfermedades (Centers for Disease Control and Prevention, CDC) señalan que los primeros signos de la COVID-19 incluyen tos, falta de aire, dificultad para respirar o al menos dos de los siguientes síntomas: escalofríos, temblor con escalofríos, dolor muscular, dolor de chasity, dolor de garganta y pérdida del gusto o del olfato. Los síntomas pueden ser de leves a graves y pueden manifestarse hasta dos semanas después de la exposición al virus.     La práctica del autoaislamiento y la cuarentena ayuda a proteger tanto al público bradly a moreno wesly al evitar la exposición a personas que tienen o podrían tener dylan enfermedad contagiosa. Siga las siguientes medidas de prevención conforme a las pautas de los CDC:    CUÁNDO INTERRUMPIR EL AISLAMIENTO: Las personas con la COVID-19 o  cualquier otra enfermedad respiratoria infecciosa que presenten síntomas y que hayan recibido indicaciones de cuidarse en moreno propio hogar podrán interrumpir el aislamiento domiciliario si se cumplen las siguientes condiciones:  · Andrew transcurrido al menos 1 día (24 horas) desde la recuperación, definida bradly la desaparición de la fiebre sin el uso de medicamentos destinados a reducirla; Y  · Andrew claudia los síntomas respiratorios (por ejemplo, tos, falta de aire); Y  · Andrew transcurrido al menos 10 días desde que aparecieron los síntomas por primera vez y no cochran habido ninguna enfermedad subsiguiente.    CONTROLE JENNA SÍNTOMAS: Si moreno enfermedad está empeorando, busque atención médica inmediata. Si tiene dylan emergencia médica y necesita llamar al 911, notifique al personal de despacho que tiene o que lo están evaluando por tratarse de un anais sospechoso o confirmado de la COVID-19 u otra enfermedad respiratoria  infecciosa. Use dylan mascarilla si es posible.    RESTRICCIÓN DE ACTIVIDADES: Restrinja las actividades fuera de moreno hogar,  excepto para recibir atención médica. No debe ir al trabajo, a la escuela ni a áreas públicas. Evite el uso de transporte público, transporte compartido o taxis.    CITAS MÉDICAS PROGRAMADAS: Notifique a moreno proveedor que tiene o que lo están evaluando por tratarse de un anais sospechoso o confirmado de la COVID-19 u otra enfermedad respiratoria infecciosa. [facility] ayudará a la oficina del proveedor de atención médica a cuidar de usted de manera [place] y a evitar que otras personas se infecten o se expongan.    MASCARILLAS, cuándo usarlas: Siempre que esté fuera de moreno casa o cerca de otras personas o mascotas. Si no puede usar dylan mascarilla, mantenga dylan distancia mínima de 6 pies de los demás.  ENTORNO DONDE VIVE: Permanezca en dylan habitación separada de otras personas y mascotas. Si es posible, use un baño separado, pídales a otras personas que cuiden de noel mascotas y evite compartir artículos del hogar. Cualquier artículo que use debe lavarse donovan con agua y jabón. Limpie todas las superficies “de mucho uso” todos los días. Use un spray de limpieza doméstico o dylan toallita, de acuerdo con las instrucciones de la etiqueta. Las superficies “de mucho uso” incluyen (entre otras cosas) mesones, mesas, pomos de danyelle, accesorios de baño, inodoros, teléfonos, teclados, tabletas y mesitas de noche.    LAVADO DE ANNE: Lávese las anne con frecuencia con agua y jabón wilma al menos 20 segundos, especialmente después de sonarse la nariz, toser o estornudar; después de ir al baño; antes y después de interactuar con mascotas; y antes y después de las comidas o de preparar alimentos. Si las anne están visiblemente sucias, use agua y jabón. Si no hay agua y jabón disponible, use un desinfectante para anne a base de alcohol con al menos 60 % de alcohol. Evite tocarse los ojos, la nariz y la boca antes de  lavarse las alee. Cúbrase la boca y la nariz con un pañuelo cuando estornude o tosa. Tire los pañuelos usados en un bote de basura con bolsa. Lávese las alee inmediatamente.    AUTOCONTROL ACTIVO/FACILITADO: Siga las instrucciones proporcionadas por el departamento de aissatou o los profesionales médicos a nivel local, según corresponda.  Cuando trabaje con moreno departamento de aissatou local, verifique noel horarios de disponibilidad.    CONDADO NÚMERO DE TELÉFONO   [facility] [phone]   [facility] [phone]   [place] [address] [phone]     SI TIENE UN RESULTADO POSITIVO CONFIRMADO DE LA COVID-19:  Las personas que se hayan recuperado por completo de la COVID-19 pueden tener anticuerpos en moreno plasma (denominado “plasma convaleciente”) que generan dylan respuesta inmune, y esto podría usarse para tratar a pacientes gravemente enfermos con COVID-19.  [facility] está entusiasmado por comenzar a trabajar con [employer] en la recolección de plasma convaleciente de personas que se peraza recuperado de la COVID-19 bradly parte de un programa para tratar a pacientes infectados con el virus. [place] “fármaco nuevo en fase de investigación clínica de emergencia” aprobado por la Administración de Alimentos y Medicamentos (Food and Drug Administration, FDA) es un producto  hemoderivado especial con anticuerpos que pueden brindar a los pacientes un refuerzo adicional para combatir el virus.    Para ser elegible para donar plasma convaleciente, debe tay tenido un diagnóstico previo de COVID-19 documentado a partir de dylan prueba de laboratorio (o un resultado positivo de anticuerpos de SARS-CoV-2) y cumplir con requisitos de elegibilidad adicionales.  Si le interesa donar plasma convaleciente o tiene preguntas, comuníquese con el coordinador del proyecto Plasma Convaleciente de Rik izaguirre [phone] o por correo electrónico a [email or web address].      Prevención de infecciones en el  hogar  Infection Prevention in the Home  Si tiene dylan infección, puede haberse expuesto a dylan infección o está cuidando a alguien que tiene dylan infección, es importante saber cómo evitar que la infección se propague. Siga las instrucciones del médico y emerita uso de estas pautas para evitar la propagación de la infección.  Cómo se propagan las infecciones  Para que dylan infección se propague, debe existir lo siguiente:  · Un germen. [place] puede ser un virus, dylan bacteria, un hongo o un parásito.  · Un lugar donde viva el germen. [place] puede ser lo siguiente:  ? En dylan persona, animal, planta o alimento.  ? En el suelo o en el agua.  ? En superficies bradly la manija de dylan danni.  · Dylan persona o animal que pueda desarrollar dylan enfermedad si el germen ingresa en moreno cuerpo (anfitrión). El anfitrión no tiene resistencia al germen.  · Dylan manera de que el germen ingrese al anfitrión. [place] puede ocurrir de las siguientes maneras:  ? Por contacto directo con dylan persona o animal infectado. [place] puede suceder al darse la mano o abrazarse. Algunos gérmenes también pueden desplazarse a través del aire y propagarse a otras personas. [place] puede ocurrir cuando dylan persona infectada tose o estornuda sobre o cerca de otras personas.  ? Contacto indirecto. [place] sucede cuando el germen ingresa en el anfitrión a través del contacto con un objeto infectado. Por ejemplo:  § Ingerir o beber alimentos o agua que tengan el germen (estén contaminados).  § Al tocar dylan superficie contaminada con las alee y luego llevarse la mano a la paola, la boca, la nariz o los ojos.  Materiales necesarios:  · Jabón.  · Desinfectante para alee a base de alcohol.  · Productos de limpieza estándar.  · Desinfectantes, bradly lejía.  · Toallas de papel o paños de limpieza o esponjas reutilizables.  · Guantes de trabajo desechables o reutilizables.  Cómo evitar que la infección se propague  Hay varias cosas que puede hacer para prevenir la propagación de la  infección.  [place] estas medidas generales  Todas las personas deben luna las siguientes medidas para evitar la propagación de la infección:  · Lávese las alee regularmente con agua y jabón wilma al menos 20 segundos. Use desinfectante para alee con alcohol si no dispone de agua y jabón.  · Evitar tocarse la paola, la boca, la nariz y los ojos.  · Tosa o estornude en un pañuelo de papel o en moreno manga o codo en lugar de hacerlo en la mano o en el aire.  ? Si tose o estornuda en un pañuelo de papel, deséchelo inmediatamente y lávese las alee.    Mantenga el baño limpio  · [place] jabón.  · Cambiar las toallas y las toallitas de mano con frecuencia.  · Cambiar los cepillos de dientes a menudo y guardarlos por separado en un lugar limpio y seco.  · Limpie y desinfecte todas las superficies; entre ellas, el inodoro, el piso, la bañera, la ducha y el lavabo.  · No comparta elementos personales, bradly afeitadoras, cepillos de dientes, desodorantes, peines, cepillos, toallas y toallitas de mano.  Mantenga la higiene en la cocina    · Lávese las alee antes y después de preparar los alimentos y antes de comer.  · Limpiar el interior del refrigerador todas las semanas.  · Mantener el refrigerador en 40 °F (4 °C) o menos y el congelador a dylan temperatura de 0 °F (-18 °C) o menos.  · Mantenga las superficies de trabajo limpias. Desinféctelas periódicamente.  · Parminder la vajilla con [place] y jabonosa. Secar la vajilla al aire o usar un lavavajillas.  · No comparta platos ni utensilios para comer.  Manipule los alimentos de manera cunningham.  · Almacene los alimentos cuidadosamente.  · Refrigere las sobras de inmediato en recipientes con tapa.  · Tire alimentos rancios o en mal estado.  · Descongele los alimentos en el refrigerador o el microondas, no a temperatura ambiente.  · Sirva los alimentos a la temperatura adecuada. No coma carne cruda. Asegúrese de cocinar la carne a la temperatura adecuada. Cocine los huevos hasta  que estén firmes.  · Lave las frutas y las verduras debajo del agua corriente.  · Use tablas de risa, platos y utensilios distintos para alimentos crudos y alimentos cocidos.  · Use dylan cuchara limpia cada vez que prueba la comida mientras cocina.  Lave la ropa de la manera correcta  · Use guantes si la ropa está visiblemente sucia.  · No sacuda la ropa sucia. Hacerlo puede esparcir los gérmenes por el aire.  · Lave la ropa con [place].  · Si no puede teresa la ropa de inmediato, colóquela en dylan bolsa plástica y lávela lo antes posible.  Tenga cuidado con los animales y mascotas.  · Lávese las alee antes y después de tocar animales.  · Si tiene dylan mascota, asegúrese de mantenerla limpia. No permita que personas con un sistema inmunitario débil toquen excremento de pájaros, el agua de la pecera ni bandejas sanitarias.  ? Si tiene dylan jaula para mascotas o dylan caja de arena, asegúrese de limpiarla todos los días.  · Si está enfermo, manténgase alejado de los animales y pídale a otra persona que los cuide si es posible.  Cómo limpiar y desinfectar objetos y superficies  Precauciones  · Algunos desinfectantes funcionan con ciertos gérmenes y no con otros. Essence las instrucciones del fabricante o essence los recursos en línea para determinar si el producto que está usando funcionará con el germen que usted trata de eliminar.  · Si elige emplear lejía, úsela de manera cunningham. Nunca los mezcle con otros productos de limpieza, en especial, si contienen amoníaco. Esta mezcla puede producir un gas peligroso que puede ser mortal.  · Mantenga un movimiento adecuado de aire fresco en moreno casa (ventilación).  · Vierta el agua que usó para trapear en el lavabo de servicio o en el inodoro. No vierta oralia agua en el fregadero de la cocina.  Objetos y superficies    · Si las superficies están visiblemente sucias, límpielas maine con agua y jabón antes de desinfectarlas.  · Desinfecte las superficies que se tocan con frecuencia  todos los días. Pueden incluir:  ? Encimeras.  ? Mesas.  ? Picaportes.  ? Lavabos, fregaderos y grifos.  ? Dispositivos electrónicos, bradly:  § Teléfonos.  § Controles remotos.  § Teclados.  § Computadoras y tablets.  Suministros de limpieza  Algunos suministros de limpieza pueden cultivar gérmenes. Cuídelos donovan para evitar que los gérmenes se propaguen. Para hacer esto:  · Remoje cepillos de inodoro, trapeadores y esponjas en lejía y agua wilma 5 minutos después de cada uso, o según las instrucciones del fabricante.  · Lave los paños de limpieza reutilizables y desinfecte las esponjas después de cada uso.  · Deseche los guantes desechables después de un uso.  · Reemplace los guantes reutilizables si están rotos o rasgados o si se empiezan a pelar.  Medidas adicionales si está enfermo  Si vive con otras personas:    · Evite el contacto cercano con las personas que lo rodean. Permanezca a dylan distancia de al menos 3 pies (1 m) de las otras personas, si es posible.  · Use un baño aparte, si es posible.  · De ser posible, duerma en un dormitorio aparte o en dylan cama aparte para evitar infectar a otros miembros de la wesly.  ? Cambiar la ropa luis de los dormitorios todas las semanas o cuando esté sucia.  · Anahi que todos los integrantes del hogar se laven las alee con agua y jabón con frecuencia. Use desinfectante para alee con alcohol si no dispone de agua y jabón.  En general:  · Quédese en moreno casa, excepto para obtener atención médica. Llame con anticipación antes de visitar al médico.  · Pídales a otras personas que le rima la compra del supermercado y los suministros para el hogar, y que surtan las recetas de noel medicamentos.  · Evite las zonas públicas. Trate de no viajar en transporte público.  · Si puede, use dylan mascarilla si debe salir de la casa o si está en contacto cercano con alguien que no está enfermo.  · No reciba visitas hasta que se haya recuperado completamente o hasta que no tenga signos  ni síntomas de infección.  · Evite preparar alimentos o cuidar a otras personas. Si debe preparar alimentos o cuidar a otras personas, use dylan mascarilla y lávese las alee antes y después de hacer estas cosas.  Dónde buscar más información  · Centers for Disease Control and Prevention (Centros para el Control y la Prevención de Enfermedades): www.cdc.gov/nonpharmaceutical-interventions/index.html.  · [employer] [place] (OMS): www.who.int/infection-prevention/about/en/  · Association for Professionals in Infection Control and Epidemiology (Asociación de profesionales del control de infecciones y epidemiología): professionals.site.apic.org/settings-of-care/non-healthcare-setting/home/  Resumen  · Es importante saber cómo evitar que la infección se propague.  · Asegúrese de que en moreno hogar todos se laven las alee con agua y jabón con frecuencia.  · Desinfecte las superficies que se tocan con frecuencia todos los días.  · Si está enfermo, quédese en moreno casa, excepto para obtener atención médica.  Esta información no tiene bradly fin reemplazar el consejo del médico. Asegúrese de hacerle al médico cualquier pregunta que tenga.  Document Released: 11/30/2009 Document Revised: 03/30/2020 Document Reviewed: 03/30/2020  Elsevier Patient Education © 2020 Elsevier Inc.

## 2020-11-08 NOTE — DISCHARGE PLANNING
Meds-to-Beds: Discharge prescription orders listed below delivered to patient's bedside by [facility]. RN notified. Patient not counseled.       Katelynn Shaffer   [place] Medication Instructions MATTHEW:[id number]    Printed on:11/07/20 2082   Medication Information                      amLODIPine (NORVASC) 5 MG Tab  Take 1 Tab by mouth every day.                 Corine Valle, PharmD

## 2022-07-11 ENCOUNTER — TELEPHONE (OUTPATIENT)
Dept: CARDIOLOGY | Facility: MEDICAL CENTER | Age: 67
End: 2022-07-11
Payer: COMMERCIAL

## 2022-07-11 NOTE — TELEPHONE ENCOUNTER
Used language line  , ID #[id number] to call pt. Pt states that the stabbing feeling occurred on Saturday and has not occurred again since. It was located in the middle of her chest, it was constant. No other s/s.     She does not take her BP/HR but she does have the equipment to do so. She does recall earting something spicy that day. She did not take any meds like Tums or Tylenol to see if this alleviated the pain. The pain went away on its own.     Advised pt to take BP/HR daily and let us know if BP is consistently >130/80 or < 90/60. Advised pt to monitor herself closely, go to the ER if chest pain returns, attend scheduled f/u appt on 8/1, and call us for any further concerns before then.

## 2022-07-11 NOTE — TELEPHONE ENCOUNTER
"TT    Hello, this patient stopped by the office today wanting to make an appointment with Dr. Johns, she mentioned that she has been having this like \"stabbing\" feeling on her chest that comes and goes and lasts maybe less than 1 minute. She is very concerned and would like a call back at [phone]. Patient does speak [place].    Thank you!    "

## 2025-05-27 NOTE — PROGRESS NOTES
Chief Complaint   Patient presents with   • Abdominal Pain     bilateral lower abdomen     History of present illness:   61 y.o. presents to be evaluated for bilateral lower abdominal pain  She reports 1 month duration constant sharp pelvic pain that is worse at night  She experiences the pain also when she is about to have a bowel movement  It is 9-10 in the pain scale of 10  No periods for 1 year, but she had an episode of vaginal bleeding 5 months ago  No urinary complaints  (+) constipation    Review of systems:  Pertinent positives documented in HPI and all other systems reviewed & are negative    All PMH, PSH, allergies, social history and FH reviewed and updated today:  Past Medical History   Diagnosis Date   • Kidney stones    • Hyperlipidemia    • CAD (coronary artery disease) 06/2016     MIKI X 2 to mid and distal LAD and balloon to diagonal in 12/16       Past Surgical History   Procedure Laterality Date   • Lithotripsy     • Angioplasty     • Cardiac cath  6/5/16      2 MIKI to LAD       Allergies: No Known Allergies    Social History     Social History   • Marital Status:      Spouse Name: N/A   • Number of Children: N/A   • Years of Education: N/A     Occupational History   • Not on file.     Social History Main Topics   • Smoking status: Never Smoker    • Smokeless tobacco: Never Used   • Alcohol Use: Yes      Comment: occ socially    • Drug Use: No   • Sexual Activity: Not on file     Other Topics Concern   • Not on file     Social History Narrative       History reviewed. No pertinent family history.    Physical exam:  Blood pressure 122/70, height 1.524 m (5'), weight 58.333 kg (128 lb 9.6 oz), not currently breastfeeding.    General:appears stated age, is in no apparent distress, is well developed and well nourished  Head: normocephalic, non-tender  Abdomen: Bowel sounds positive, nondistended, soft, nontender x4, no rebound or guarding. No organomegaly. No masses.  Female GYN: NEG  SSE - no  lesions in the cervix and vagina, dry  BME - cervix is closed, no CMT  Uterus is normal in size, no tenderness  No adnexal masses, no tenderness  Skin: No rashes, or ulcers or lesions seen  Psychiatric: Patient shows appropriate affect, is alert and oriented x3, intact judgment and insight.  1. Pelvic pain  POCT Urinalysis   2. Bulgarian speaking patient     3. Constipation, unspecified constipation type     3. Translation done by [employer]  4. Pt reassured that no pelvic masses detected.   5. Reviewed TVS she had in 2014 - wnl. No need to repeat TVS  6. Consider GI referral   7. All questions addressed  8. Annual gyn    Spent  30 minutes in face-to-face patient contact in which greater than 50% of that visit was spent in counseling/coordination of care      18